# Patient Record
Sex: FEMALE | Race: BLACK OR AFRICAN AMERICAN | NOT HISPANIC OR LATINO
[De-identification: names, ages, dates, MRNs, and addresses within clinical notes are randomized per-mention and may not be internally consistent; named-entity substitution may affect disease eponyms.]

---

## 2021-01-19 PROBLEM — Z00.00 ENCOUNTER FOR PREVENTIVE HEALTH EXAMINATION: Status: ACTIVE | Noted: 2021-01-19

## 2021-01-20 ENCOUNTER — APPOINTMENT (OUTPATIENT)
Dept: NEUROLOGY | Facility: CLINIC | Age: 71
End: 2021-01-20
Payer: MEDICARE

## 2021-01-20 VITALS
DIASTOLIC BLOOD PRESSURE: 77 MMHG | OXYGEN SATURATION: 98 % | WEIGHT: 117 LBS | TEMPERATURE: 97.8 F | SYSTOLIC BLOOD PRESSURE: 121 MMHG | HEIGHT: 62 IN | BODY MASS INDEX: 21.53 KG/M2 | HEART RATE: 84 BPM

## 2021-01-20 DIAGNOSIS — I63.9 CEREBRAL INFARCTION, UNSPECIFIED: ICD-10-CM

## 2021-01-20 PROCEDURE — 99204 OFFICE O/P NEW MOD 45 MIN: CPT

## 2021-01-20 PROCEDURE — 99072 ADDL SUPL MATRL&STAF TM PHE: CPT

## 2021-01-20 RX ORDER — ATORVASTATIN CALCIUM 20 MG/1
20 TABLET, FILM COATED ORAL
Qty: 90 | Refills: 0 | Status: ACTIVE | COMMUNITY
Start: 2021-01-20 | End: 1900-01-01

## 2021-01-20 NOTE — REVIEW OF SYSTEMS
[Poor Coordination] : poor coordination [Numbness] : numbness [Difficulty Walking] : difficulty walking [Ataxia] : ataxia [Limping] : limping [Eyesight Problems] : eyesight problems [Fever] : no fever [Feeling Poorly] : not feeling poorly [Feeling Tired] : not feeling tired [Confused or Disoriented] : no confusion [Memory Lapses or Loss] : no memory loss [Decr. Concentrating Ability] : no decrease in concentrating ability [Difficulty with Language] : no ~M difficulty with language [Facial Weakness] : no facial weakness [Arm Weakness] : no arm weakness [Hand Weakness] : no hand weakness [Leg Weakness] : no leg weakness [Difficulty Writing] : no difficulty writing [Difficulties in Speech] : no speech difficulties [Tingling] : no tingling [Hypersensitivity] : no hypersensitivity [Seizures] : no convulsions [Dizziness] : no dizziness [Fainting] : no fainting [Lightheadedness] : no lightheadedness [Vertigo] : no vertigo [Migraine Headache] : no migraine headache [Inability to Walk] : able to walk [Frequent Falls] : not falling [Suicidal] : not suicidal [Sleep Disturbances] : no sleep disturbances [Anxiety] : no anxiety [Depression] : no depression [Eye Pain] : no eye pain [Red Eyes] : eyes not red [Discharge From Eyes] : no purulent discharge from the eyes [Dry Eyes] : no dryness of the eyes [Earache] : no earache [Loss Of Hearing] : no hearing loss [Nosebleeds] : no nosebleeds [Nasal Discharge] : no nasal discharge [Heart Rate Is Slow] : the heart rate was not slow [Heart Rate Is Fast] : the heart rate was not fast [Chest Pain] : no chest pain [Palpitations] : no palpitations [Leg Claudication] : no intermittent leg claudication [Lower Ext Edema] : no lower extremity edema [Shortness Of Breath] : no shortness of breath [Wheezing] : no wheezing [Cough] : no cough [SOB on Exertion] : no shortness of breath during exertion [Orthopnea] : no orthopnea [PND] : no PND [Abdominal Pain] : no abdominal pain [Vomiting] : no vomiting [Constipation] : no constipation [Diarrhea] : no diarrhea [Heartburn] : no heartburn [Melena] : no melena [Dysuria] : no dysuria [Incontinence] : no incontinence [Pelvic Pain] : no pelvic pain [Arthralgias] : no arthralgias [Joint Pain] : no joint pain [Joint Swelling] : no joint swelling [Skin Lesions] : no skin lesions [Skin Wound] : no skin wound [Muscle Weakness] : no muscle weakness [Easy Bleeding] : no tendency for easy bleeding [Easy Bruising] : no tendency for easy bruising [Swollen Glands] : no swollen glands [Swollen Glands In The Neck] : no swollen glands in the neck

## 2021-01-20 NOTE — HISTORY OF PRESENT ILLNESS
[FreeTextEntry1] : In 2010 she suffered  difficulty walking and a tendency to fall to her left. She was taken to the hospital and evaluated and sent home with a diagnosis of stroke. She had to retire at that point. Three months ago in the midst of the Coronavirus pandemic she suffered an acute attack of vertigo (spinning) that she suffered on her own for 3 days after which she was better. She did not go to the hospital for fear of the pandemic. At some time in the past she was evaluated by a cardiologist who monitored her rhythm with a recorder for 3 months and reportedly found not abnormality

## 2021-01-20 NOTE — PHYSICAL EXAM
[General Appearance - Alert] : alert [General Appearance - In No Acute Distress] : in no acute distress [Oriented To Time, Place, And Person] : oriented to person, place, and time [Impaired Insight] : insight and judgment were intact [Affect] : the affect was normal [Person] : oriented to person [Place] : oriented to place [Time] : oriented to time [Short Term Intact] : short term memory intact [Remote Intact] : remote memory intact [Registration Intact] : recent registration memory intact [Span Intact] : the attention span was normal [Concentration Intact] : normal concentrating ability [Visual Intact] : visual attention was ~T not ~L decreased [Naming Objects] : no difficulty naming common objects [Repeating Phrases] : no difficulty repeating a phrase [Fluency] : fluency intact [Comprehension] : comprehension intact [Cranial Nerves Optic (II)] : visual acuity intact bilaterally,  visual fields full to confrontation, pupils equal round and reactive to light [Cranial Nerves Oculomotor (III)] : extraocular motion intact [Cranial Nerves Trigeminal (V)] : facial sensation intact symmetrically [Cranial Nerves Facial (VII)] : face symmetrical [Cranial Nerves Vestibulocochlear (VIII)] : hearing was intact bilaterally [Cranial Nerves Glossopharyngeal (IX)] : tongue and palate midline [Cranial Nerves Accessory (XI - Cranial And Spinal)] : head turning and shoulder shrug symmetric [CNS Hypoglossal Tongue Protrusion Deviated To Left] : tongue deviates to the left with protrusion [Motor Handedness Right-Handed] : the patient is right hand dominant [Paresis Pronator Drift Left-Sided] : a left-sided pronator drift was present [Motor Strength Upper Extremities Left] : there was weakness of the left upper extremity [Motor Strength Lower Extremities Left] : there was weakness of the left lower extremity [Sensation Tactile Decrease] : light touch was intact [Sensation Pain / Temperature Decrease] : pain and temperature was intact [Sensation Vibration Decrease] : vibration was intact [Proprioception] : proprioception was intact [Limited Balance] : the patient's balance was impaired [Dysdiadochokinesia On The Left] : present on the left side [Coordination - Dysmetria Impaired Finger-to-Nose Left Only] : present on the left side [Coordination Dysmetria Impaired Heel-to-Shin Using Left Heel] : present on the left side [2+] : Brachioradialis right 2+ [3+] : Brachioradialis left 3+ [1+] : Patella left 1+ [0] : Ankle jerk left 0 [Plantar Reflex Left Only] : abnormal on the left [Sclera] : the sclera and conjunctiva were normal [PERRL With Normal Accommodation] : pupils were equal in size, round, reactive to light, with normal accommodation [Extraocular Movements] : extraocular movements were intact [Optic Disc Abnormality] : the optic disc were normal in size and color [No APD] : no afferent pupillary defect [No MALIKA] : no internuclear ophthalmoplegia [Full Visual Field] : full visual field [Outer Ear] : the ears and nose were normal in appearance [Oropharynx] : the oropharynx was normal [Paresis Pronator Drift Right-Sided] : no pronator drift on the right [Coordination - Dysmetria Impaired Finger-to-Nose Right Only] : not present on the ride side [Dysdiadochokinesia On The Right] : not present on the ride side [Plantar Reflex Right Only] : normal on the right [___] : absent on the right [___] : absent on the left [Primitive Reflexes] : primitive reflexes were absent [FreeTextEntry1] : Acuity 20/30 OS 20/30-3 OD with correction

## 2021-01-20 NOTE — DISCUSSION/SUMMARY
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

## 2021-01-21 ENCOUNTER — INPATIENT (INPATIENT)
Facility: HOSPITAL | Age: 71
LOS: 3 days | Discharge: HOME | DRG: 638 | End: 2021-01-25
Attending: STUDENT IN AN ORGANIZED HEALTH CARE EDUCATION/TRAINING PROGRAM | Admitting: STUDENT IN AN ORGANIZED HEALTH CARE EDUCATION/TRAINING PROGRAM
Payer: COMMERCIAL

## 2021-01-21 VITALS
RESPIRATION RATE: 16 BRPM | TEMPERATURE: 98 F | SYSTOLIC BLOOD PRESSURE: 153 MMHG | OXYGEN SATURATION: 99 % | HEIGHT: 65 IN | WEIGHT: 119.05 LBS | HEART RATE: 70 BPM | DIASTOLIC BLOOD PRESSURE: 91 MMHG

## 2021-01-21 DIAGNOSIS — R73.9 HYPERGLYCEMIA, UNSPECIFIED: ICD-10-CM

## 2021-01-21 LAB
ACETONE SERPL-MCNC: NEGATIVE — SIGNIFICANT CHANGE UP
ALBUMIN SERPL ELPH-MCNC: 3.3 G/DL — LOW (ref 3.5–5)
ALBUMIN SERPL ELPH-MCNC: 4.1 G/DL
ALP BLD-CCNC: 162 U/L
ALP SERPL-CCNC: 211 U/L — HIGH (ref 40–120)
ALT FLD-CCNC: 30 U/L DA — SIGNIFICANT CHANGE UP (ref 10–60)
ALT SERPL-CCNC: 22 U/L
ANION GAP SERPL CALC-SCNC: 15 MMOL/L
ANION GAP SERPL CALC-SCNC: 9 MMOL/L — SIGNIFICANT CHANGE UP (ref 5–17)
APPEARANCE UR: CLEAR — SIGNIFICANT CHANGE UP
APPEARANCE: CLEAR
AST SERPL-CCNC: 17 U/L — SIGNIFICANT CHANGE UP (ref 10–40)
AST SERPL-CCNC: 20 U/L
BACTERIA # UR AUTO: ABNORMAL /HPF
BACTERIA: NEGATIVE
BASE EXCESS BLDV CALC-SCNC: 1.9 MMOL/L — SIGNIFICANT CHANGE UP (ref -2–2)
BASOPHILS # BLD AUTO: 0.02 K/UL — SIGNIFICANT CHANGE UP (ref 0–0.2)
BASOPHILS # BLD AUTO: 0.04 K/UL
BASOPHILS NFR BLD AUTO: 0.6 % — SIGNIFICANT CHANGE UP (ref 0–2)
BASOPHILS NFR BLD AUTO: 1.1 %
BILIRUB SERPL-MCNC: 0.7 MG/DL
BILIRUB SERPL-MCNC: 0.7 MG/DL — SIGNIFICANT CHANGE UP (ref 0.2–1.2)
BILIRUB UR-MCNC: NEGATIVE — SIGNIFICANT CHANGE UP
BILIRUBIN URINE: NEGATIVE
BLOOD GAS COMMENTS, VENOUS: SIGNIFICANT CHANGE UP
BLOOD URINE: NEGATIVE
BUN SERPL-MCNC: 10 MG/DL
BUN SERPL-MCNC: 11 MG/DL — SIGNIFICANT CHANGE UP (ref 7–18)
CALCIUM SERPL-MCNC: 9.6 MG/DL — SIGNIFICANT CHANGE UP (ref 8.4–10.5)
CALCIUM SERPL-MCNC: 9.8 MG/DL
CHLORIDE SERPL-SCNC: 94 MMOL/L
CHLORIDE SERPL-SCNC: 98 MMOL/L — SIGNIFICANT CHANGE UP (ref 96–108)
CHOLEST SERPL-MCNC: 213 MG/DL
CO2 SERPL-SCNC: 25 MMOL/L
CO2 SERPL-SCNC: 27 MMOL/L — SIGNIFICANT CHANGE UP (ref 22–31)
COLOR SPEC: YELLOW — SIGNIFICANT CHANGE UP
COLOR: NORMAL
CREAT SERPL-MCNC: 0.89 MG/DL
CREAT SERPL-MCNC: 0.93 MG/DL — SIGNIFICANT CHANGE UP (ref 0.5–1.3)
DIFF PNL FLD: NEGATIVE — SIGNIFICANT CHANGE UP
EOSINOPHIL # BLD AUTO: 0.09 K/UL
EOSINOPHIL # BLD AUTO: 0.11 K/UL — SIGNIFICANT CHANGE UP (ref 0–0.5)
EOSINOPHIL NFR BLD AUTO: 2.5 %
EOSINOPHIL NFR BLD AUTO: 3.2 % — SIGNIFICANT CHANGE UP (ref 0–6)
EPI CELLS # UR: SIGNIFICANT CHANGE UP /HPF
ESTIMATED AVERAGE GLUCOSE: >398 MG/DL
GLUCOSE QUALITATIVE U: ABNORMAL
GLUCOSE SERPL-MCNC: 558 MG/DL — CRITICAL HIGH (ref 70–99)
GLUCOSE SERPL-MCNC: 561 MG/DL
GLUCOSE UR QL: 1000 MG/DL
HBA1C MFR BLD HPLC: >15.5 %
HCO3 BLDV-SCNC: 27 MMOL/L — SIGNIFICANT CHANGE UP (ref 21–29)
HCT VFR BLD CALC: 41.4 % — SIGNIFICANT CHANGE UP (ref 34.5–45)
HCT VFR BLD CALC: 41.7 %
HDLC SERPL-MCNC: 100 MG/DL
HGB BLD-MCNC: 13.2 G/DL
HGB BLD-MCNC: 13.2 G/DL — SIGNIFICANT CHANGE UP (ref 11.5–15.5)
HOROWITZ INDEX BLDV+IHG-RTO: 21 — SIGNIFICANT CHANGE UP
HYALINE CASTS: 0 /LPF
IMM GRANULOCYTES NFR BLD AUTO: 0 % — SIGNIFICANT CHANGE UP (ref 0–1.5)
IMM GRANULOCYTES NFR BLD AUTO: 0.3 %
KETONES UR-MCNC: NEGATIVE — SIGNIFICANT CHANGE UP
KETONES URINE: ABNORMAL
LDLC SERPL CALC-MCNC: 95 MG/DL
LEUKOCYTE ESTERASE UR-ACNC: NEGATIVE — SIGNIFICANT CHANGE UP
LEUKOCYTE ESTERASE URINE: NEGATIVE
LYMPHOCYTES # BLD AUTO: 1.1 K/UL
LYMPHOCYTES # BLD AUTO: 1.22 K/UL — SIGNIFICANT CHANGE UP (ref 1–3.3)
LYMPHOCYTES # BLD AUTO: 35.1 % — SIGNIFICANT CHANGE UP (ref 13–44)
LYMPHOCYTES NFR BLD AUTO: 30 %
MAGNESIUM SERPL-MCNC: 1.9 MG/DL — SIGNIFICANT CHANGE UP (ref 1.6–2.6)
MAN DIFF?: NORMAL
MCHC RBC-ENTMCNC: 28.2 PG — SIGNIFICANT CHANGE UP (ref 27–34)
MCHC RBC-ENTMCNC: 28.8 PG
MCHC RBC-ENTMCNC: 31.7 GM/DL
MCHC RBC-ENTMCNC: 31.9 GM/DL — LOW (ref 32–36)
MCV RBC AUTO: 88.5 FL — SIGNIFICANT CHANGE UP (ref 80–100)
MCV RBC AUTO: 91 FL
MICROSCOPIC-UA: NORMAL
MONOCYTES # BLD AUTO: 0.29 K/UL
MONOCYTES # BLD AUTO: 0.3 K/UL — SIGNIFICANT CHANGE UP (ref 0–0.9)
MONOCYTES NFR BLD AUTO: 7.9 %
MONOCYTES NFR BLD AUTO: 8.6 % — SIGNIFICANT CHANGE UP (ref 2–14)
NEUTROPHILS # BLD AUTO: 1.83 K/UL — SIGNIFICANT CHANGE UP (ref 1.8–7.4)
NEUTROPHILS # BLD AUTO: 2.14 K/UL
NEUTROPHILS NFR BLD AUTO: 52.5 % — SIGNIFICANT CHANGE UP (ref 43–77)
NEUTROPHILS NFR BLD AUTO: 58.2 %
NITRITE UR-MCNC: NEGATIVE — SIGNIFICANT CHANGE UP
NITRITE URINE: NEGATIVE
NONHDLC SERPL-MCNC: 113 MG/DL
NRBC # BLD: 0 /100 WBCS — SIGNIFICANT CHANGE UP (ref 0–0)
PCO2 BLDV: 47 MMHG — SIGNIFICANT CHANGE UP (ref 35–50)
PH BLDV: 7.38 — SIGNIFICANT CHANGE UP (ref 7.35–7.45)
PH UR: 6.5 — SIGNIFICANT CHANGE UP (ref 5–8)
PH URINE: 6
PLATELET # BLD AUTO: 247 K/UL — SIGNIFICANT CHANGE UP (ref 150–400)
PLATELET # BLD AUTO: 266 K/UL
PO2 BLDV: 32 MMHG — SIGNIFICANT CHANGE UP (ref 25–45)
POTASSIUM SERPL-MCNC: 4 MMOL/L — SIGNIFICANT CHANGE UP (ref 3.5–5.3)
POTASSIUM SERPL-SCNC: 4 MMOL/L — SIGNIFICANT CHANGE UP (ref 3.5–5.3)
POTASSIUM SERPL-SCNC: 5 MMOL/L
PROT SERPL-MCNC: 6.7 G/DL
PROT SERPL-MCNC: 7.4 G/DL — SIGNIFICANT CHANGE UP (ref 6–8.3)
PROT UR-MCNC: NEGATIVE — SIGNIFICANT CHANGE UP
PROTEIN URINE: NEGATIVE
RBC # BLD: 4.58 M/UL
RBC # BLD: 4.68 M/UL — SIGNIFICANT CHANGE UP (ref 3.8–5.2)
RBC # FLD: 12.8 % — SIGNIFICANT CHANGE UP (ref 10.3–14.5)
RBC # FLD: 13 %
RBC CASTS # UR COMP ASSIST: SIGNIFICANT CHANGE UP /HPF (ref 0–2)
RED BLOOD CELLS URINE: 0 /HPF
SAO2 % BLDV: 55 % — LOW (ref 67–88)
SODIUM SERPL-SCNC: 134 MMOL/L
SODIUM SERPL-SCNC: 134 MMOL/L — LOW (ref 135–145)
SP GR SPEC: 1 — LOW (ref 1.01–1.02)
SPECIFIC GRAVITY URINE: 1.04
SQUAMOUS EPITHELIAL CELLS: 0 /HPF
TRIGL SERPL-MCNC: 90 MG/DL
UROBILINOGEN FLD QL: NEGATIVE — SIGNIFICANT CHANGE UP
UROBILINOGEN URINE: NORMAL
WBC # BLD: 3.48 K/UL — LOW (ref 3.8–10.5)
WBC # FLD AUTO: 3.48 K/UL — LOW (ref 3.8–10.5)
WBC # FLD AUTO: 3.67 K/UL
WBC UR QL: SIGNIFICANT CHANGE UP /HPF (ref 0–5)
WHITE BLOOD CELLS URINE: 0 /HPF

## 2021-01-21 PROCEDURE — 99222 1ST HOSP IP/OBS MODERATE 55: CPT

## 2021-01-21 PROCEDURE — 99285 EMERGENCY DEPT VISIT HI MDM: CPT

## 2021-01-21 RX ORDER — SODIUM CHLORIDE 9 MG/ML
1000 INJECTION INTRAMUSCULAR; INTRAVENOUS; SUBCUTANEOUS ONCE
Refills: 0 | Status: COMPLETED | OUTPATIENT
Start: 2021-01-21 | End: 2021-01-21

## 2021-01-21 RX ORDER — INSULIN HUMAN 100 [IU]/ML
5 INJECTION, SOLUTION SUBCUTANEOUS ONCE
Refills: 0 | Status: COMPLETED | OUTPATIENT
Start: 2021-01-21 | End: 2021-01-21

## 2021-01-21 RX ADMIN — SODIUM CHLORIDE 1000 MILLILITER(S): 9 INJECTION INTRAMUSCULAR; INTRAVENOUS; SUBCUTANEOUS at 21:39

## 2021-01-21 RX ADMIN — SODIUM CHLORIDE 1000 MILLILITER(S): 9 INJECTION INTRAMUSCULAR; INTRAVENOUS; SUBCUTANEOUS at 22:30

## 2021-01-21 NOTE — ED PROVIDER NOTE - OBJECTIVE STATEMENT
70F, pmh of htn, CVA (residual left sided weakness), presenting with hyperglycemia. patient reports she has had increased thirst and urination. was sent to emergency department by her doctor for elevated blood sugar. no fever, chest pain, shortness of breath, nausea, vomiting, pain or burning with urination.

## 2021-01-21 NOTE — H&P ADULT - NSHPPHYSICALEXAM_GEN_ALL_CORE
Vital Signs (24 Hrs):  T(C): 36.2 (01-21-21 @ 23:58), Max: 36.4 (01-21-21 @ 19:25)  HR: 60 (01-21-21 @ 23:58) (60 - 70)  BP: 130/73 (01-21-21 @ 23:58) (130/73 - 153/91)  RR: 17 (01-21-21 @ 23:58) (16 - 17)  SpO2: 98% (01-21-21 @ 23:58) (98% - 99%)

## 2021-01-21 NOTE — H&P ADULT - PROBLEM SELECTOR PLAN 1
Presented with hyperglycemia  Was given 5 units of regular insulin  Will give 10 units of lantus now, weight based  f/u HBA1c  Endo consult Dr Hannah

## 2021-01-21 NOTE — H&P ADULT - HISTORY OF PRESENT ILLNESS
71 yo female with medical history of htn, CVA in 2010 (residual left sided weakness), presenting with hyperglycemia. She states that she visited Neurologist(Dr Dykes) yesterday for routine check, was found to have hyperglycemia on routine labs. She was called today by him and was asked to go to ED. She reports having increased thirst and urination for past few weeks. She denies tingling or numbness of toes and fingers. Denies weight loss, appetite change or recent illness. She denies fever, chest pain, shortness of breath, nausea, vomiting, pain or burning with urination. No h/o sick contacts, trauma or fall.

## 2021-01-21 NOTE — ED ADULT TRIAGE NOTE - CHIEF COMPLAINT QUOTE
pt states she was sent by Dr. Chaney, her PMD, for hyperglycemia, pt states she has been unsteady and having generalized numbness for many days

## 2021-01-21 NOTE — H&P ADULT - ATTENDING COMMENTS
Pt seen and examined.  Case discussed with admitting resident.  70 year old woman with PMH of right hemispheric CVA with left sided weakness, HTN brought in on account of polyuria and polydipsia and noted with significantly elevated blood glucose. NO known hx of DM2.    Vital Signs Last 24 Hrs  T(C): 36.4 (21 Jan 2021 19:25), Max: 36.4 (21 Jan 2021 19:25)  T(F): 97.6 (21 Jan 2021 19:25), Max: 97.6 (21 Jan 2021 19:25)  HR: 70 (21 Jan 2021 19:25) (70 - 70)  BP: 153/91 (21 Jan 2021 19:25) (153/91 - 153/91)  RR: 16 (21 Jan 2021 19:25) (16 - 16)  SpO2: 99% (21 Jan 2021 19:25) (99% - 99%)      Labs                        13.2   3.48  )-----------( 247      ( 21 Jan 2021 21:26 )             41.4    01-21    134<L>  |  98  |  11  ----------------------------<  558<HH>  4.0   |  27  |  0.93    Ca    9.6      21 Jan 2021 21:26; Mg     1.9     01-21  TPro  7.4  /  Alb  3.3<L>  /  TBili  0.7  /  DBili  x   /  AST  17  /  ALT  30  /  AlkPhos  211<H>  01-21    Urinalysis Basic - ( 21 Jan 2021 20:52 ) glycosuria - 1000    - Impression  Hyperglycemia   - likely due to last -onset DM 2  Admit to Medicine  IVF hydration to correct glucose- induced diuresis  Exogenous subcut insulin to control elevated glucose  A1c, lipid panel, TSH  Endocrine consult Pt seen and examined.  Case discussed with admitting resident.  70 year old woman with PMH of right hemispheric CVA with left sided weakness, HTN brought in on account of polyuria and polydipsia and noted with significantly elevated blood glucose. NO known hx of DM2.    Vital Signs Last 24 Hrs  T(C): 36.4 (21 Jan 2021 19:25), Max: 36.4 (21 Jan 2021 19:25)  T(F): 97.6 (21 Jan 2021 19:25), Max: 97.6 (21 Jan 2021 19:25)  HR: 70 (21 Jan 2021 19:25) (70 - 70)  BP: 153/91 (21 Jan 2021 19:25) (153/91 - 153/91)  RR: 16 (21 Jan 2021 19:25) (16 - 16)  SpO2: 99% (21 Jan 2021 19:25) (99% - 99%)    Sensory deficits in the glove and stocking distribution  Otherwise other exams as above    Labs                        13.2   3.48  )-----------( 247      ( 21 Jan 2021 21:26 )             41.4    01-21    134<L>  |  98  |  11  ----------------------------<  558<HH>  4.0   |  27  |  0.93    Ca    9.6      21 Jan 2021 21:26; Mg     1.9     01-21  TPro  7.4  /  Alb  3.3<L>  /  TBili  0.7  /  DBili  x   /  AST  17  /  ALT  30  /  AlkPhos  211<H>  01-21    Urinalysis Basic - ( 21 Jan 2021 20:52 ) glycosuria - 1000    - Impression  Hyperglycemia   - likely due to new -onset DM 2 with suspected motor/sensory neuropathy  Admit to Medicine  IVF hydration to correct glucose- induced diuresis  Exogenous subcut insulin to control elevated glucose  A1c, lipid panel, TSH  Endocrine consult

## 2021-01-21 NOTE — ED PROVIDER NOTE - CLINICAL SUMMARY MEDICAL DECISION MAKING FREE TEXT BOX
70F presenting with hyperglycemia. no fever, nausea, vomiting, chest pain or shortness of breath. dka vs diabetes. plan for labs, fluids, will reassess.

## 2021-01-21 NOTE — H&P ADULT - ASSESSMENT
71 yo female with medical history of htn, CVA in 2010 (residual left sided weakness), presenting with hyperglycemia.    ED:  [id number]    Patient is being admitted to medicine for new onset diabetes.

## 2021-01-21 NOTE — H&P ADULT - MS EXT PE MLT D E PC
She has multiple constitutional symptoms. I suspect she is primarily anxious and worried about her health and possibly depressed. In order to rule out in endocrine for rheumatologic systemic disorder we will order some blood work including hormone levels vitamin levels and bubbles of inflammatory markers. If these are all normal I think she might be candidate for low-dose antidepressant.   no clubbing/no cyanosis/no pedal edema

## 2021-01-22 DIAGNOSIS — E11.9 TYPE 2 DIABETES MELLITUS WITHOUT COMPLICATIONS: ICD-10-CM

## 2021-01-22 DIAGNOSIS — I63.9 CEREBRAL INFARCTION, UNSPECIFIED: ICD-10-CM

## 2021-01-22 DIAGNOSIS — Z29.9 ENCOUNTER FOR PROPHYLACTIC MEASURES, UNSPECIFIED: ICD-10-CM

## 2021-01-22 DIAGNOSIS — I10 ESSENTIAL (PRIMARY) HYPERTENSION: ICD-10-CM

## 2021-01-22 LAB
ALBUMIN SERPL ELPH-MCNC: 2.6 G/DL — LOW (ref 3.5–5)
ALP SERPL-CCNC: 138 U/L — HIGH (ref 40–120)
ALT FLD-CCNC: 25 U/L DA — SIGNIFICANT CHANGE UP (ref 10–60)
ANION GAP SERPL CALC-SCNC: 7 MMOL/L — SIGNIFICANT CHANGE UP (ref 5–17)
AST SERPL-CCNC: 16 U/L — SIGNIFICANT CHANGE UP (ref 10–40)
B PERT DNA SPEC QL NAA+PROBE: SIGNIFICANT CHANGE UP
BASOPHILS # BLD AUTO: 0.03 K/UL — SIGNIFICANT CHANGE UP (ref 0–0.2)
BASOPHILS NFR BLD AUTO: 0.9 % — SIGNIFICANT CHANGE UP (ref 0–2)
BILIRUB SERPL-MCNC: 0.7 MG/DL — SIGNIFICANT CHANGE UP (ref 0.2–1.2)
BUN SERPL-MCNC: 7 MG/DL — SIGNIFICANT CHANGE UP (ref 7–18)
C PNEUM DNA SPEC QL NAA+PROBE: SIGNIFICANT CHANGE UP
CALCIUM SERPL-MCNC: 8.9 MG/DL — SIGNIFICANT CHANGE UP (ref 8.4–10.5)
CHLORIDE SERPL-SCNC: 105 MMOL/L — SIGNIFICANT CHANGE UP (ref 96–108)
CHOLEST SERPL-MCNC: 168 MG/DL — SIGNIFICANT CHANGE UP
CO2 SERPL-SCNC: 25 MMOL/L — SIGNIFICANT CHANGE UP (ref 22–31)
CREAT SERPL-MCNC: 0.67 MG/DL — SIGNIFICANT CHANGE UP (ref 0.5–1.3)
EOSINOPHIL # BLD AUTO: 0.15 K/UL — SIGNIFICANT CHANGE UP (ref 0–0.5)
EOSINOPHIL NFR BLD AUTO: 4.3 % — SIGNIFICANT CHANGE UP (ref 0–6)
FLUAV H1 2009 PAND RNA SPEC QL NAA+PROBE: SIGNIFICANT CHANGE UP
FLUAV H1 RNA SPEC QL NAA+PROBE: SIGNIFICANT CHANGE UP
FLUAV H3 RNA SPEC QL NAA+PROBE: SIGNIFICANT CHANGE UP
FLUAV SUBTYP SPEC NAA+PROBE: SIGNIFICANT CHANGE UP
FLUBV RNA SPEC QL NAA+PROBE: SIGNIFICANT CHANGE UP
FOLATE SERPL-MCNC: 17.2 NG/ML — SIGNIFICANT CHANGE UP
GLUCOSE BLDC GLUCOMTR-MCNC: 190 MG/DL — HIGH (ref 70–99)
GLUCOSE BLDC GLUCOMTR-MCNC: 286 MG/DL — HIGH (ref 70–99)
GLUCOSE BLDC GLUCOMTR-MCNC: 287 MG/DL — HIGH (ref 70–99)
GLUCOSE BLDC GLUCOMTR-MCNC: 355 MG/DL — HIGH (ref 70–99)
GLUCOSE SERPL-MCNC: 288 MG/DL — HIGH (ref 70–99)
HADV DNA SPEC QL NAA+PROBE: SIGNIFICANT CHANGE UP
HCOV PNL SPEC NAA+PROBE: SIGNIFICANT CHANGE UP
HCT VFR BLD CALC: 35.5 % — SIGNIFICANT CHANGE UP (ref 34.5–45)
HCV AB S/CO SERPL IA: 0.07 S/CO — SIGNIFICANT CHANGE UP (ref 0–0.99)
HCV AB SERPL-IMP: SIGNIFICANT CHANGE UP
HDLC SERPL-MCNC: 91 MG/DL — SIGNIFICANT CHANGE UP
HGB BLD-MCNC: 11.2 G/DL — LOW (ref 11.5–15.5)
HMPV RNA SPEC QL NAA+PROBE: SIGNIFICANT CHANGE UP
HPIV1 RNA SPEC QL NAA+PROBE: SIGNIFICANT CHANGE UP
HPIV2 RNA SPEC QL NAA+PROBE: SIGNIFICANT CHANGE UP
HPIV3 RNA SPEC QL NAA+PROBE: SIGNIFICANT CHANGE UP
HPIV4 RNA SPEC QL NAA+PROBE: SIGNIFICANT CHANGE UP
IMM GRANULOCYTES NFR BLD AUTO: 0.3 % — SIGNIFICANT CHANGE UP (ref 0–1.5)
INR BLD: 0.97 RATIO — SIGNIFICANT CHANGE UP (ref 0.88–1.16)
LIPID PNL WITH DIRECT LDL SERPL: 65 MG/DL — SIGNIFICANT CHANGE UP
LYMPHOCYTES # BLD AUTO: 1.39 K/UL — SIGNIFICANT CHANGE UP (ref 1–3.3)
LYMPHOCYTES # BLD AUTO: 40.3 % — SIGNIFICANT CHANGE UP (ref 13–44)
MAGNESIUM SERPL-MCNC: 1.6 MG/DL — SIGNIFICANT CHANGE UP (ref 1.6–2.6)
MCHC RBC-ENTMCNC: 27.9 PG — SIGNIFICANT CHANGE UP (ref 27–34)
MCHC RBC-ENTMCNC: 31.5 GM/DL — LOW (ref 32–36)
MCV RBC AUTO: 88.5 FL — SIGNIFICANT CHANGE UP (ref 80–100)
MONOCYTES # BLD AUTO: 0.36 K/UL — SIGNIFICANT CHANGE UP (ref 0–0.9)
MONOCYTES NFR BLD AUTO: 10.4 % — SIGNIFICANT CHANGE UP (ref 2–14)
NEUTROPHILS # BLD AUTO: 1.51 K/UL — LOW (ref 1.8–7.4)
NEUTROPHILS NFR BLD AUTO: 43.8 % — SIGNIFICANT CHANGE UP (ref 43–77)
NON HDL CHOLESTEROL: 77 MG/DL — SIGNIFICANT CHANGE UP
NRBC # BLD: 0 /100 WBCS — SIGNIFICANT CHANGE UP (ref 0–0)
PHOSPHATE SERPL-MCNC: 2 MG/DL — LOW (ref 2.5–4.5)
PLATELET # BLD AUTO: 220 K/UL — SIGNIFICANT CHANGE UP (ref 150–400)
POTASSIUM SERPL-MCNC: 3.6 MMOL/L — SIGNIFICANT CHANGE UP (ref 3.5–5.3)
POTASSIUM SERPL-SCNC: 3.6 MMOL/L — SIGNIFICANT CHANGE UP (ref 3.5–5.3)
PROT SERPL-MCNC: 5.9 G/DL — LOW (ref 6–8.3)
PROTHROM AB SERPL-ACNC: 11.6 SEC — SIGNIFICANT CHANGE UP (ref 10.6–13.6)
RAPID RVP RESULT: SIGNIFICANT CHANGE UP
RBC # BLD: 4.01 M/UL — SIGNIFICANT CHANGE UP (ref 3.8–5.2)
RBC # FLD: 12.7 % — SIGNIFICANT CHANGE UP (ref 10.3–14.5)
RSV RNA SPEC QL NAA+PROBE: SIGNIFICANT CHANGE UP
RV+EV RNA SPEC QL NAA+PROBE: SIGNIFICANT CHANGE UP
SARS-COV-2 IGG SERPL QL IA: NEGATIVE — SIGNIFICANT CHANGE UP
SARS-COV-2 IGM SERPL IA-ACNC: <0.1 INDEX — SIGNIFICANT CHANGE UP
SARS-COV-2 RNA SPEC QL NAA+PROBE: SIGNIFICANT CHANGE UP
SODIUM SERPL-SCNC: 137 MMOL/L — SIGNIFICANT CHANGE UP (ref 135–145)
TRIGL SERPL-MCNC: 60 MG/DL — SIGNIFICANT CHANGE UP
TSH SERPL-MCNC: 2.44 UU/ML — SIGNIFICANT CHANGE UP (ref 0.34–4.82)
VIT B12 SERPL-MCNC: 1305 PG/ML — HIGH (ref 232–1245)
WBC # BLD: 3.45 K/UL — LOW (ref 3.8–10.5)
WBC # FLD AUTO: 3.45 K/UL — LOW (ref 3.8–10.5)

## 2021-01-22 PROCEDURE — 99232 SBSQ HOSP IP/OBS MODERATE 35: CPT | Mod: GC

## 2021-01-22 RX ORDER — LIDOCAINE 4 G/100G
1 CREAM TOPICAL ONCE
Refills: 0 | Status: DISCONTINUED | OUTPATIENT
Start: 2021-01-22 | End: 2021-01-22

## 2021-01-22 RX ORDER — AMLODIPINE BESYLATE 2.5 MG/1
10 TABLET ORAL DAILY
Refills: 0 | Status: DISCONTINUED | OUTPATIENT
Start: 2021-01-22 | End: 2021-01-25

## 2021-01-22 RX ORDER — INSULIN GLARGINE 100 [IU]/ML
15 INJECTION, SOLUTION SUBCUTANEOUS AT BEDTIME
Refills: 0 | Status: DISCONTINUED | OUTPATIENT
Start: 2021-01-22 | End: 2021-01-25

## 2021-01-22 RX ORDER — ATORVASTATIN CALCIUM 80 MG/1
20 TABLET, FILM COATED ORAL AT BEDTIME
Refills: 0 | Status: DISCONTINUED | OUTPATIENT
Start: 2021-01-22 | End: 2021-01-23

## 2021-01-22 RX ORDER — POTASSIUM PHOSPHATE, MONOBASIC POTASSIUM PHOSPHATE, DIBASIC 236; 224 MG/ML; MG/ML
30 INJECTION, SOLUTION INTRAVENOUS ONCE
Refills: 0 | Status: COMPLETED | OUTPATIENT
Start: 2021-01-22 | End: 2021-01-22

## 2021-01-22 RX ORDER — INSULIN GLARGINE 100 [IU]/ML
10 INJECTION, SOLUTION SUBCUTANEOUS AT BEDTIME
Refills: 0 | Status: DISCONTINUED | OUTPATIENT
Start: 2021-01-22 | End: 2021-01-22

## 2021-01-22 RX ORDER — INSULIN LISPRO 100/ML
VIAL (ML) SUBCUTANEOUS
Refills: 0 | Status: DISCONTINUED | OUTPATIENT
Start: 2021-01-22 | End: 2021-01-25

## 2021-01-22 RX ORDER — PANTOPRAZOLE SODIUM 20 MG/1
40 TABLET, DELAYED RELEASE ORAL
Refills: 0 | Status: DISCONTINUED | OUTPATIENT
Start: 2021-01-22 | End: 2021-01-25

## 2021-01-22 RX ORDER — ENOXAPARIN SODIUM 100 MG/ML
40 INJECTION SUBCUTANEOUS DAILY
Refills: 0 | Status: DISCONTINUED | OUTPATIENT
Start: 2021-01-22 | End: 2021-01-25

## 2021-01-22 RX ORDER — INSULIN LISPRO 100/ML
4 VIAL (ML) SUBCUTANEOUS
Refills: 0 | Status: DISCONTINUED | OUTPATIENT
Start: 2021-01-22 | End: 2021-01-25

## 2021-01-22 RX ADMIN — INSULIN HUMAN 5 UNIT(S): 100 INJECTION, SOLUTION SUBCUTANEOUS at 01:24

## 2021-01-22 RX ADMIN — AMLODIPINE BESYLATE 10 MILLIGRAM(S): 2.5 TABLET ORAL at 06:30

## 2021-01-22 RX ADMIN — INSULIN GLARGINE 15 UNIT(S): 100 INJECTION, SOLUTION SUBCUTANEOUS at 21:28

## 2021-01-22 RX ADMIN — PANTOPRAZOLE SODIUM 40 MILLIGRAM(S): 20 TABLET, DELAYED RELEASE ORAL at 06:30

## 2021-01-22 RX ADMIN — SODIUM CHLORIDE 1000 MILLILITER(S): 9 INJECTION INTRAMUSCULAR; INTRAVENOUS; SUBCUTANEOUS at 01:18

## 2021-01-22 RX ADMIN — Medication 3: at 16:45

## 2021-01-22 RX ADMIN — ATORVASTATIN CALCIUM 20 MILLIGRAM(S): 80 TABLET, FILM COATED ORAL at 21:29

## 2021-01-22 RX ADMIN — POTASSIUM PHOSPHATE, MONOBASIC POTASSIUM PHOSPHATE, DIBASIC 83.33 MILLIMOLE(S): 236; 224 INJECTION, SOLUTION INTRAVENOUS at 09:07

## 2021-01-22 RX ADMIN — INSULIN GLARGINE 10 UNIT(S): 100 INJECTION, SOLUTION SUBCUTANEOUS at 03:57

## 2021-01-22 RX ADMIN — Medication 3: at 12:09

## 2021-01-22 RX ADMIN — Medication 4 UNIT(S): at 16:45

## 2021-01-22 RX ADMIN — Medication 2: at 08:30

## 2021-01-22 RX ADMIN — ENOXAPARIN SODIUM 40 MILLIGRAM(S): 100 INJECTION SUBCUTANEOUS at 11:34

## 2021-01-22 RX ADMIN — Medication 4 UNIT(S): at 12:09

## 2021-01-22 NOTE — CONSULT NOTE ADULT - SUBJECTIVE AND OBJECTIVE BOX
Patient is a 70y old  Female who presents with a chief complaint of Hyperglycemia (2021 23:35)      HPI:  71 yo female with medical history of htn, CVA in 2010 (residual left sided weakness), presenting with hyperglycemia. She states that she visited Neurologist(Dr Dykes) yesterday for routine check, was found to have hyperglycemia on routine labs. She was called today by him and was asked to go to ED. She reports having increased thirst and urination for past few weeks. She denies tingling or numbness of toes and fingers. Denies weight loss, appetite change or recent illness. She denies fever, chest pain, shortness of breath, nausea, vomiting, pain or burning with urination. No h/o sick contacts, trauma or fall. (2021 23:35). Pt admits to symptomatic hyperglycemia for past few weeks.       PAST MEDICAL & SURGICAL HISTORY:  CVA (cerebral vascular accident)    HTN (hypertension)           MEDICATIONS  (STANDING):  amLODIPine   Tablet 10 milliGRAM(s) Oral daily  atorvastatin 20 milliGRAM(s) Oral at bedtime  enoxaparin Injectable 40 milliGRAM(s) SubCutaneous daily  insulin glargine Injectable (LANTUS) 15 Unit(s) SubCutaneous at bedtime  insulin lispro (ADMELOG) corrective regimen sliding scale   SubCutaneous three times a day before meals  insulin lispro Injectable (ADMELOG) 4 Unit(s) SubCutaneous three times a day before meals  pantoprazole    Tablet 40 milliGRAM(s) Oral before breakfast    MEDICATIONS  (PRN):      FAMILY HISTORY:      SOCIAL HISTORY:      REVIEW OF SYSTEMS:  CONSTITUTIONAL: No fever, weight loss, or fatigue  EYES: No eye pain, visual disturbances, or discharge  ENT:  No difficulty hearing, tinnitus, vertigo; No sinus or throat pain  NECK: No pain or stiffness  RESPIRATORY: No cough, wheezing, chills or hemoptysis; No Shortness of Breath  CARDIOVASCULAR: No chest pain, palpitations, passing out, dizziness, or leg swelling  GASTROINTESTINAL: No abdominal or epigastric pain. No nausea, vomiting, or hematemesis; No diarrhea or constipation. No melena or hematochezia.  GENITOURINARY: No dysuria, frequency, hematuria, or incontinence  NEUROLOGICAL: No headaches, memory loss, loss of strength, numbness, or tremors  SKIN: No itching, burning, rashes, or lesions   LYMPH Nodes: No enlarged glands  ENDOCRINE: No heat or cold intolerance; No hair loss  MUSCULOSKELETAL: No joint pain or swelling; No muscle, back, or extremity pain  PSYCHIATRIC: No depression, anxiety, mood swings, or difficulty sleeping  HEME/LYMPH: No easy bruising, or bleeding gums  ALLERGY AND IMMUNOLOGIC: No hives or eczema	        Vital Signs Last 24 Hrs  T(C): 36.7 (2021 06:17), Max: 36.7 (2021 06:17)  T(F): 98.1 (2021 06:17), Max: 98.1 (2021 06:17)  HR: 57 (2021 06:17) (56 - 70)  BP: 129/73 (2021 06:17) (129/73 - 153/91)  BP(mean): --  RR: 17 (2021 06:17) (15 - 17)  SpO2: 100% (2021 06:17) (98% - 100%)      Constitutional:      HEENT: nad    Neck:  No JVD, bruits or thyromegaly    Respiratory:  Clear without rales or rhonchi    Cardiovascular:  RR without murmur, rub or gallop.    Gastrointestinal: Soft without hepatosplenomegaly.    Extremities: without cyanosis, clubbing or edema.    Neurological:  Oriented   x 3     . No gross sensory or motor defects.        LABS:                        11.2   3.45  )-----------( 220      ( 2021 03:56 )             35.5         137  |  105  |  7   ----------------------------<  288<H>  3.6   |  25  |  0.67    Ca    8.9      2021 03:56  Phos  2.0       Mg     1.6         TPro  5.9<L>  /  Alb  2.6<L>  /  TBili  0.7  /  DBili  x   /  AST  16  /  ALT  25  /  AlkPhos  138<H>          PT/INR - ( 2021 07:32 )   PT: 11.6 sec;   INR: 0.97 ratio           Urinalysis Basic - ( 2021 20:52 )    Color: Yellow / Appearance: Clear / S.005 / pH: x  Gluc: x / Ketone: Negative  / Bili: Negative / Urobili: Negative   Blood: x / Protein: Negative / Nitrite: Negative   Leuk Esterase: Negative / RBC: 0-2 /HPF / WBC 0-2 /HPF   Sq Epi: x / Non Sq Epi: Few /HPF / Bacteria: Few /HPF      CAPILLARY BLOOD GLUCOSE      POCT Blood Glucose.: 355 mg/dL (2021 01:10)  POCT Blood Glucose.: 560 mg/dL (2021 20:47)      RADIOLOGY & ADDITIONAL STUDIES:

## 2021-01-22 NOTE — PROGRESS NOTE ADULT - PROBLEM SELECTOR PLAN 1
Presented with hyperglycemia  Was given 5 units of regular insulin  Will give 10 units of lantus now, weight based  F/u HBA1c   F/u GADA, C peptide, Islet cell antibody    Endo consult Dr Hannah ; recommended 15units of lantus and 4 units premeals   Patient will need for insulin tx upon discharge, She is reluctant but agreed Presented with hyperglycemia  Was given 5 units of regular insulin  Will give 10 units of lantus now, weight based  F/u HBA1c   F/u GADA, C peptide, Islet cell antibody    -AM glucagon level - r/o glucagonoma  Endo consult Dr Hannah ; recommended 15units of lantus and 4 units premeals   Patient will need for insulin tx upon discharge, She is reluctant but agreed

## 2021-01-22 NOTE — CONSULT NOTE ADULT - PROBLEM SELECTOR RECOMMENDATION 9
new onset symptomatic hyperglycemia  rec chnaging lantus to 15 units  add admelog 4 ac tid  d/w pt need for insulin tx  reluctant but agreed  lives with her daughter who is a nursing student  dm teaching  nutrition [employer] ac and hs  d/w hs

## 2021-01-22 NOTE — PROGRESS NOTE ADULT - SUBJECTIVE AND OBJECTIVE BOX
PGY-1 Progress Note discussed with attending    PAGER #: [-----] TILL 5:00 PM  PLEASE CONTACT ON CALL TEAM:  - On Call Team (Please refer to Eder) FROM 5:00 PM - 8:30PM  - Nightfloat Team FROM 8:30 -7:30 AM    INTERVAL HPI/OVERNIGHT EVENTS: No acute events overnight     MEDICATIONS:  amLODIPine   Tablet 10 milliGRAM(s) Oral daily  atorvastatin 20 milliGRAM(s) Oral at bedtime  enoxaparin Injectable 40 milliGRAM(s) SubCutaneous daily  insulin glargine Injectable (LANTUS) 15 Unit(s) SubCutaneous at bedtime  insulin lispro (ADMELOG) corrective regimen sliding scale   SubCutaneous three times a day before meals  insulin lispro Injectable (ADMELOG) 4 Unit(s) SubCutaneous three times a day before meals  pantoprazole    Tablet 40 milliGRAM(s) Oral before breakfast      REVIEW OF SYSTEMS:  CONSTITUTIONAL: No fever, weight loss, or fatigue  RESPIRATORY: No cough, wheezing, chills or hemoptysis; No shortness of breath  CARDIOVASCULAR: No chest pain, palpitations, dizziness, or leg swelling  GASTROINTESTINAL: No abdominal pain. No nausea, vomiting, or hematemesis; No diarrhea or constipation. No melena or hematochezia.  GENITOURINARY: No dysuria or hematuria, urinary frequency  NEUROLOGICAL: No headaches, memory loss, loss of strength, numbness, or tremors  SKIN: No itching, burning, rashes, or lesions     Vital Signs Last 24 Hrs  T(C): 36.7 (22 Jan 2021 06:17), Max: 36.7 (22 Jan 2021 06:17)  T(F): 98.1 (22 Jan 2021 06:17), Max: 98.1 (22 Jan 2021 06:17)  HR: 57 (22 Jan 2021 06:17) (56 - 70)  BP: 129/73 (22 Jan 2021 06:17) (129/73 - 153/91)  BP(mean): --  RR: 17 (22 Jan 2021 06:17) (15 - 17)  SpO2: 100% (22 Jan 2021 06:17) (98% - 100%)    PHYSICAL EXAMINATION:  GENERAL: NAD, well built  HEAD:  Atraumatic, Normocephalic  EYES:  conjunctiva and sclera clear  NECK: Supple, No JVD, Normal thyroid  CHEST/LUNG: Clear to auscultation. Clear to percussion bilaterally; No rales, rhonchi, wheezing, or rubs  HEART: Regular rate and rhythm; No murmurs, rubs, or gallops  ABDOMEN: Soft, Nontender, Nondistended; Bowel sounds present  NERVOUS SYSTEM:  Alert & Oriented X3,    EXTREMITIES:  2+ Peripheral Pulses, No clubbing, cyanosis, or edema  SKIN: warm dry                          11.2   3.45  )-----------( 220      ( 22 Jan 2021 03:56 )             35.5     01-22    137  |  105  |  7   ----------------------------<  288<H>  3.6   |  25  |  0.67    Ca    8.9      22 Jan 2021 03:56  Phos  2.0     01-22  Mg     1.6     01-22    TPro  5.9<L>  /  Alb  2.6<L>  /  TBili  0.7  /  DBili  x   /  AST  16  /  ALT  25  /  AlkPhos  138<H>  01-22    LIVER FUNCTIONS - ( 22 Jan 2021 03:56 )  Alb: 2.6 g/dL / Pro: 5.9 g/dL / ALK PHOS: 138 U/L / ALT: 25 U/L DA / AST: 16 U/L / GGT: x               PT/INR - ( 22 Jan 2021 07:32 )   PT: 11.6 sec;   INR: 0.97 ratio             CAPILLARY BLOOD GLUCOSE      RADIOLOGY & ADDITIONAL TESTS:

## 2021-01-22 NOTE — CONSULT NOTE ADULT - ASSESSMENT
71 yo female with medical history of htn, CVA in 2010 (residual left sided weakness), presenting with hyperglycemia.  Pt admits to symptomatic hyperglycemia for past few weeks.

## 2021-01-23 LAB
ALBUMIN SERPL ELPH-MCNC: 2.4 G/DL — LOW (ref 3.5–5)
ALP SERPL-CCNC: 86 U/L — SIGNIFICANT CHANGE UP (ref 40–120)
ALT FLD-CCNC: 24 U/L DA — SIGNIFICANT CHANGE UP (ref 10–60)
ANION GAP SERPL CALC-SCNC: 7 MMOL/L — SIGNIFICANT CHANGE UP (ref 5–17)
AST SERPL-CCNC: 17 U/L — SIGNIFICANT CHANGE UP (ref 10–40)
BASOPHILS # BLD AUTO: 0.03 K/UL — SIGNIFICANT CHANGE UP (ref 0–0.2)
BASOPHILS NFR BLD AUTO: 0.8 % — SIGNIFICANT CHANGE UP (ref 0–2)
BILIRUB SERPL-MCNC: 1 MG/DL — SIGNIFICANT CHANGE UP (ref 0.2–1.2)
BUN SERPL-MCNC: 10 MG/DL — SIGNIFICANT CHANGE UP (ref 7–18)
CALCIUM SERPL-MCNC: 8.8 MG/DL — SIGNIFICANT CHANGE UP (ref 8.4–10.5)
CHLORIDE SERPL-SCNC: 106 MMOL/L — SIGNIFICANT CHANGE UP (ref 96–108)
CO2 SERPL-SCNC: 28 MMOL/L — SIGNIFICANT CHANGE UP (ref 22–31)
CREAT SERPL-MCNC: 0.7 MG/DL — SIGNIFICANT CHANGE UP (ref 0.5–1.3)
EOSINOPHIL # BLD AUTO: 0.15 K/UL — SIGNIFICANT CHANGE UP (ref 0–0.5)
EOSINOPHIL NFR BLD AUTO: 4 % — SIGNIFICANT CHANGE UP (ref 0–6)
GLUCOSE BLDC GLUCOMTR-MCNC: 129 MG/DL — HIGH (ref 70–99)
GLUCOSE BLDC GLUCOMTR-MCNC: 130 MG/DL — HIGH (ref 70–99)
GLUCOSE BLDC GLUCOMTR-MCNC: 165 MG/DL — HIGH (ref 70–99)
GLUCOSE BLDC GLUCOMTR-MCNC: 239 MG/DL — HIGH (ref 70–99)
GLUCOSE BLDC GLUCOMTR-MCNC: 322 MG/DL — HIGH (ref 70–99)
GLUCOSE SERPL-MCNC: 111 MG/DL — HIGH (ref 70–99)
HCT VFR BLD CALC: 37.3 % — SIGNIFICANT CHANGE UP (ref 34.5–45)
HGB BLD-MCNC: 11.8 G/DL — SIGNIFICANT CHANGE UP (ref 11.5–15.5)
IMM GRANULOCYTES NFR BLD AUTO: 0.3 % — SIGNIFICANT CHANGE UP (ref 0–1.5)
LYMPHOCYTES # BLD AUTO: 1.48 K/UL — SIGNIFICANT CHANGE UP (ref 1–3.3)
LYMPHOCYTES # BLD AUTO: 39.8 % — SIGNIFICANT CHANGE UP (ref 13–44)
MAGNESIUM SERPL-MCNC: 1.8 MG/DL — SIGNIFICANT CHANGE UP (ref 1.6–2.6)
MCHC RBC-ENTMCNC: 28.4 PG — SIGNIFICANT CHANGE UP (ref 27–34)
MCHC RBC-ENTMCNC: 31.6 GM/DL — LOW (ref 32–36)
MCV RBC AUTO: 89.7 FL — SIGNIFICANT CHANGE UP (ref 80–100)
MONOCYTES # BLD AUTO: 0.36 K/UL — SIGNIFICANT CHANGE UP (ref 0–0.9)
MONOCYTES NFR BLD AUTO: 9.7 % — SIGNIFICANT CHANGE UP (ref 2–14)
NEUTROPHILS # BLD AUTO: 1.69 K/UL — LOW (ref 1.8–7.4)
NEUTROPHILS NFR BLD AUTO: 45.4 % — SIGNIFICANT CHANGE UP (ref 43–77)
NRBC # BLD: 0 /100 WBCS — SIGNIFICANT CHANGE UP (ref 0–0)
PHOSPHATE SERPL-MCNC: 3.4 MG/DL — SIGNIFICANT CHANGE UP (ref 2.5–4.5)
PLATELET # BLD AUTO: 234 K/UL — SIGNIFICANT CHANGE UP (ref 150–400)
POTASSIUM SERPL-MCNC: 3.4 MMOL/L — LOW (ref 3.5–5.3)
POTASSIUM SERPL-SCNC: 3.4 MMOL/L — LOW (ref 3.5–5.3)
PROT SERPL-MCNC: 5.8 G/DL — LOW (ref 6–8.3)
RBC # BLD: 4.16 M/UL — SIGNIFICANT CHANGE UP (ref 3.8–5.2)
RBC # FLD: 12.9 % — SIGNIFICANT CHANGE UP (ref 10.3–14.5)
SODIUM SERPL-SCNC: 141 MMOL/L — SIGNIFICANT CHANGE UP (ref 135–145)
WBC # BLD: 3.72 K/UL — LOW (ref 3.8–10.5)
WBC # FLD AUTO: 3.72 K/UL — LOW (ref 3.8–10.5)

## 2021-01-23 PROCEDURE — 74177 CT ABD & PELVIS W/CONTRAST: CPT | Mod: 26

## 2021-01-23 PROCEDURE — 99232 SBSQ HOSP IP/OBS MODERATE 35: CPT

## 2021-01-23 RX ORDER — ATORVASTATIN CALCIUM 80 MG/1
80 TABLET, FILM COATED ORAL AT BEDTIME
Refills: 0 | Status: DISCONTINUED | OUTPATIENT
Start: 2021-01-23 | End: 2021-01-25

## 2021-01-23 RX ORDER — LISINOPRIL 2.5 MG/1
5 TABLET ORAL DAILY
Refills: 0 | Status: DISCONTINUED | OUTPATIENT
Start: 2021-01-23 | End: 2021-01-25

## 2021-01-23 RX ORDER — POTASSIUM CHLORIDE 20 MEQ
20 PACKET (EA) ORAL
Refills: 0 | Status: COMPLETED | OUTPATIENT
Start: 2021-01-23 | End: 2021-01-23

## 2021-01-23 RX ADMIN — ATORVASTATIN CALCIUM 80 MILLIGRAM(S): 80 TABLET, FILM COATED ORAL at 21:42

## 2021-01-23 RX ADMIN — Medication 4 UNIT(S): at 12:23

## 2021-01-23 RX ADMIN — Medication 20 MILLIEQUIVALENT(S): at 17:10

## 2021-01-23 RX ADMIN — Medication 4 UNIT(S): at 08:23

## 2021-01-23 RX ADMIN — ENOXAPARIN SODIUM 40 MILLIGRAM(S): 100 INJECTION SUBCUTANEOUS at 12:23

## 2021-01-23 RX ADMIN — Medication 2: at 17:11

## 2021-01-23 RX ADMIN — Medication 4 UNIT(S): at 17:11

## 2021-01-23 RX ADMIN — INSULIN GLARGINE 15 UNIT(S): 100 INJECTION, SOLUTION SUBCUTANEOUS at 21:42

## 2021-01-23 RX ADMIN — AMLODIPINE BESYLATE 10 MILLIGRAM(S): 2.5 TABLET ORAL at 05:12

## 2021-01-23 RX ADMIN — Medication 20 MILLIEQUIVALENT(S): at 12:23

## 2021-01-23 RX ADMIN — PANTOPRAZOLE SODIUM 40 MILLIGRAM(S): 20 TABLET, DELAYED RELEASE ORAL at 05:13

## 2021-01-23 RX ADMIN — Medication 1: at 12:23

## 2021-01-23 NOTE — PROGRESS NOTE ADULT - SUBJECTIVE AND OBJECTIVE BOX
Kya Yanez MD  Division of Hospital Medicine  Pager: [phone] (NS)/[phone] ([facility])    24 HOUR EVENTS/ROS:    MEDICATIONS:  amLODIPine   Tablet 10 milliGRAM(s) Oral daily  enoxaparin Injectable 40 milliGRAM(s) SubCutaneous daily          pantoprazole    Tablet 40 milliGRAM(s) Oral before breakfast    atorvastatin 20 milliGRAM(s) Oral at bedtime  insulin glargine Injectable (LANTUS) 15 Unit(s) SubCutaneous at bedtime  insulin lispro (ADMELOG) corrective regimen sliding scale   SubCutaneous three times a day before meals  insulin lispro Injectable (ADMELOG) 4 Unit(s) SubCutaneous three times a day before meals    potassium chloride    Tablet ER 20 milliEquivalent(s) Oral every 2 hours      PHYSICAL EXAM:  T(C): 36.7 (01-23-21 @ 05:15), Max: 36.8 (01-22-21 @ 20:29)  HR: 74 (01-23-21 @ 05:15) (65 - 74)  BP: 125/75 (01-23-21 @ 05:15) (125/71 - 129/74)  RR: 16 (01-23-21 @ 05:15) (16 - 18)  SpO2: 99% (01-23-21 @ 05:15) (99% - 100%)  Wt(kg): --  Daily     Daily   I&O's Summary    TELEMETRY:     Appearance: NAD	  HEENT:   Normal oral mucosa, PERRL, EOMI	  Lymphatic: No lymphadenopathy  Cardiovascular: Normal S1 S2, No JVD, No murmurs, No edema  Respiratory: Lungs clear to auscultation	  Neuro/psych: Grossly non-focal, CN 2-12 intact, AAOX3, mood and affect normal  Gastrointestinal:  Soft, Non-tender, + BS	  Skin: No rashes, No ecchymoses, No cyanosis	  Extremities: Normal range of motion, No clubbing, cyanosis or edema  Vascular: Peripheral pulses palpable 2+ bilaterally    LABS:	 	                        11.8   3.72  )-----------( 234      ( 23 Jan 2021 07:51 )             37.3     01-23    141  |  106  |  10  ----------------------------<  111<H>  3.4<L>   |  28  |  0.70  01-22    137  |  105  |  7   ----------------------------<  288<H>  3.6   |  25  |  0.67    Ca    8.8      23 Jan 2021 07:51  Ca    8.9      22 Jan 2021 03:56  Phos  3.4     01-23  Phos  2.0     01-22  Mg     1.8     01-23  Mg     1.6     01-22    TPro  5.8<L>  /  Alb  2.4<L>  /  TBili  1.0  /  DBili  x   /  AST  17  /  ALT  24  /  AlkPhos  86  01-23  TPro  5.9<L>  /  Alb  2.6<L>  /  TBili  0.7  /  DBili  x   /  AST  16  /  ALT  25  /  AlkPhos  138<H>  01-22    proBNP:   Lipid Profile:   HgA1c:   TSH:   FS: CAPILLARY BLOOD GLUCOSE      POCT Blood Glucose.: 129 mg/dL (23 Jan 2021 08:12)  POCT Blood Glucose.: 190 mg/dL (22 Jan 2021 20:57)  POCT Blood Glucose.: 287 mg/dL (22 Jan 2021 15:53)  POCT Blood Glucose.: 286 mg/dL (22 Jan 2021 12:01)    BCX/UCX:   Coags: PT/INR - ( 22 Jan 2021 07:32 )   PT: 11.6 sec;   INR: 0.97 ratio             CARDIAC MARKERS:            	    ECG:  	  RADIOLOGY:    Consult notes reviewed:  Care discussed with providers:  	 Kya Yanez MD  Division of Hospital Medicine    24 HOUR EVENTS/ROS: No acute events overnight. Denies headaches, F/C, dizziness, syncope, CP/SOB, N/V, abdominal pain, dysuria, changes in BM, peripheral swelling, skin changes, new joint aches. Tolerating PO, ambulatory.     MEDICATIONS:  amLODIPine   Tablet 10 milliGRAM(s) Oral daily  enoxaparin Injectable 40 milliGRAM(s) SubCutaneous daily  pantoprazole    Tablet 40 milliGRAM(s) Oral before breakfast  atorvastatin 20 milliGRAM(s) Oral at bedtime  insulin glargine Injectable (LANTUS) 15 Unit(s) SubCutaneous at bedtime  insulin lispro (ADMELOG) corrective regimen sliding scale   SubCutaneous three times a day before meals  insulin lispro Injectable (ADMELOG) 4 Unit(s) SubCutaneous three times a day before meals  potassium chloride    Tablet ER 20 milliEquivalent(s) Oral every 2 hours    PHYSICAL EXAM:  T(C): 36.7 (01-23-21 @ 05:15), Max: 36.8 (01-22-21 @ 20:29)  HR: 74 (01-23-21 @ 05:15) (65 - 74)  BP: 125/75 (01-23-21 @ 05:15) (125/71 - 129/74)  RR: 16 (01-23-21 @ 05:15) (16 - 18)  SpO2: 99% (01-23-21 @ 05:15) (99% - 100%)  Wt(kg): --  Daily     Daily   I&O's Summary    Appearance: NAD	  HEENT:   Normal oral mucosa, PERRL, EOMI	  Lymphatic: No lymphadenopathy  Cardiovascular: Normal S1 S2, No JVD, No murmurs, No edema  Respiratory: Lungs clear to auscultation	  Neuro/psych: Grossly non-focal, CN 2-12 intact, AAOX3, mood and affect normal  Gastrointestinal:  Soft, Non-tender, + BS	  Skin: No rashes, No ecchymoses, No cyanosis	  Extremities: Normal range of motion, No clubbing, cyanosis or edema  Vascular: Peripheral pulses palpable 2+ bilaterally    LABS: reviewed personally - FSBG controlled, potassium mildly low, B12 OK/high	                        11.8   3.72  )-----------( 234      ( 23 Jan 2021 07:51 )             37.3     01-23    141  |  106  |  10  ----------------------------<  111<H>  3.4<L>   |  28  |  0.70  01-22    137  |  105  |  7   ----------------------------<  288<H>  3.6   |  25  |  0.67    Ca    8.8      23 Jan 2021 07:51  Ca    8.9      22 Jan 2021 03:56  Phos  3.4     01-23  Phos  2.0     01-22  Mg     1.8     01-23  Mg     1.6     01-22    TPro  5.8<L>  /  Alb  2.4<L>  /  TBili  1.0  /  DBili  x   /  AST  17  /  ALT  24  /  AlkPhos  86  01-23  TPro  5.9<L>  /  Alb  2.6<L>  /  TBili  0.7  /  DBili  x   /  AST  16  /  ALT  25  /  AlkPhos  138<H>  01-22    POCT Blood Glucose.: 129 mg/dL (23 Jan 2021 08:12)  POCT Blood Glucose.: 190 mg/dL (22 Jan 2021 20:57)  POCT Blood Glucose.: 287 mg/dL (22 Jan 2021 15:53)  POCT Blood Glucose.: 286 mg/dL (22 Jan 2021 12:01)    BCX/UCX:   Coags: PT/INR - ( 22 Jan 2021 07:32 )   PT: 11.6 sec;   INR: 0.97 ratio      Consult notes reviewed: endo

## 2021-01-24 ENCOUNTER — TRANSCRIPTION ENCOUNTER (OUTPATIENT)
Age: 71
End: 2021-01-24

## 2021-01-24 LAB
A1C WITH ESTIMATED AVERAGE GLUCOSE RESULT: >15.5 % — HIGH (ref 4–5.6)
ALBUMIN SERPL ELPH-MCNC: 2.3 G/DL — LOW (ref 3.5–5)
ALP SERPL-CCNC: 89 U/L — SIGNIFICANT CHANGE UP (ref 40–120)
ALT FLD-CCNC: 24 U/L DA — SIGNIFICANT CHANGE UP (ref 10–60)
ANION GAP SERPL CALC-SCNC: 7 MMOL/L — SIGNIFICANT CHANGE UP (ref 5–17)
AST SERPL-CCNC: 22 U/L — SIGNIFICANT CHANGE UP (ref 10–40)
B-OH-BUTYR SERPL-SCNC: 0.3 MMOL/L — SIGNIFICANT CHANGE UP
BASOPHILS # BLD AUTO: 0.03 K/UL — SIGNIFICANT CHANGE UP (ref 0–0.2)
BASOPHILS NFR BLD AUTO: 1 % — SIGNIFICANT CHANGE UP (ref 0–2)
BILIRUB SERPL-MCNC: 2.2 MG/DL — HIGH (ref 0.2–1.2)
BUN SERPL-MCNC: 12 MG/DL — SIGNIFICANT CHANGE UP (ref 7–18)
C PEPTIDE SERPL-MCNC: 1 NG/ML — LOW (ref 1.1–4.4)
CALCIUM SERPL-MCNC: 8.6 MG/DL — SIGNIFICANT CHANGE UP (ref 8.4–10.5)
CHLORIDE SERPL-SCNC: 104 MMOL/L — SIGNIFICANT CHANGE UP (ref 96–108)
CO2 SERPL-SCNC: 27 MMOL/L — SIGNIFICANT CHANGE UP (ref 22–31)
CREAT SERPL-MCNC: 0.69 MG/DL — SIGNIFICANT CHANGE UP (ref 0.5–1.3)
CULTURE RESULTS: NO GROWTH — SIGNIFICANT CHANGE UP
EOSINOPHIL # BLD AUTO: 0.12 K/UL — SIGNIFICANT CHANGE UP (ref 0–0.5)
EOSINOPHIL NFR BLD AUTO: 4.1 % — SIGNIFICANT CHANGE UP (ref 0–6)
ESTIMATED AVERAGE GLUCOSE: >398 MG/DL — HIGH (ref 68–114)
GLUCOSE BLDC GLUCOMTR-MCNC: 245 MG/DL — HIGH (ref 70–99)
GLUCOSE BLDC GLUCOMTR-MCNC: 259 MG/DL — HIGH (ref 70–99)
GLUCOSE BLDC GLUCOMTR-MCNC: 260 MG/DL — HIGH (ref 70–99)
GLUCOSE BLDC GLUCOMTR-MCNC: 283 MG/DL — HIGH (ref 70–99)
GLUCOSE SERPL-MCNC: 203 MG/DL — HIGH (ref 70–99)
HCT VFR BLD CALC: 40.6 % — SIGNIFICANT CHANGE UP (ref 34.5–45)
HGB BLD-MCNC: 12.8 G/DL — SIGNIFICANT CHANGE UP (ref 11.5–15.5)
IMM GRANULOCYTES NFR BLD AUTO: 0 % — SIGNIFICANT CHANGE UP (ref 0–1.5)
LYMPHOCYTES # BLD AUTO: 1.06 K/UL — SIGNIFICANT CHANGE UP (ref 1–3.3)
LYMPHOCYTES # BLD AUTO: 35.8 % — SIGNIFICANT CHANGE UP (ref 13–44)
MAGNESIUM SERPL-MCNC: 1.8 MG/DL — SIGNIFICANT CHANGE UP (ref 1.6–2.6)
MCHC RBC-ENTMCNC: 28.5 PG — SIGNIFICANT CHANGE UP (ref 27–34)
MCHC RBC-ENTMCNC: 31.5 GM/DL — LOW (ref 32–36)
MCV RBC AUTO: 90.4 FL — SIGNIFICANT CHANGE UP (ref 80–100)
MONOCYTES # BLD AUTO: 0.28 K/UL — SIGNIFICANT CHANGE UP (ref 0–0.9)
MONOCYTES NFR BLD AUTO: 9.5 % — SIGNIFICANT CHANGE UP (ref 2–14)
NEUTROPHILS # BLD AUTO: 1.47 K/UL — LOW (ref 1.8–7.4)
NEUTROPHILS NFR BLD AUTO: 49.6 % — SIGNIFICANT CHANGE UP (ref 43–77)
NRBC # BLD: 0 /100 WBCS — SIGNIFICANT CHANGE UP (ref 0–0)
PHOSPHATE SERPL-MCNC: 3.2 MG/DL — SIGNIFICANT CHANGE UP (ref 2.5–4.5)
PLATELET # BLD AUTO: 237 K/UL — SIGNIFICANT CHANGE UP (ref 150–400)
POTASSIUM SERPL-MCNC: 4 MMOL/L — SIGNIFICANT CHANGE UP (ref 3.5–5.3)
POTASSIUM SERPL-SCNC: 4 MMOL/L — SIGNIFICANT CHANGE UP (ref 3.5–5.3)
PROT SERPL-MCNC: 5.8 G/DL — LOW (ref 6–8.3)
RBC # BLD: 4.49 M/UL — SIGNIFICANT CHANGE UP (ref 3.8–5.2)
RBC # FLD: 12.6 % — SIGNIFICANT CHANGE UP (ref 10.3–14.5)
SODIUM SERPL-SCNC: 138 MMOL/L — SIGNIFICANT CHANGE UP (ref 135–145)
SPECIMEN SOURCE: SIGNIFICANT CHANGE UP
WBC # BLD: 2.96 K/UL — LOW (ref 3.8–10.5)
WBC # FLD AUTO: 2.96 K/UL — LOW (ref 3.8–10.5)

## 2021-01-24 PROCEDURE — 99238 HOSP IP/OBS DSCHRG MGMT 30/<: CPT | Mod: GC

## 2021-01-24 RX ORDER — ATORVASTATIN CALCIUM 80 MG/1
0 TABLET, FILM COATED ORAL
Qty: 0 | Refills: 0 | DISCHARGE

## 2021-01-24 RX ORDER — ATORVASTATIN CALCIUM 80 MG/1
1 TABLET, FILM COATED ORAL
Qty: 30 | Refills: 0
Start: 2021-01-24 | End: 2021-02-22

## 2021-01-24 RX ORDER — LISINOPRIL 2.5 MG/1
1 TABLET ORAL
Qty: 15 | Refills: 0
Start: 2021-01-24 | End: 2021-02-07

## 2021-01-24 RX ORDER — INSULIN LISPRO 100/ML
4 VIAL (ML) SUBCUTANEOUS
Qty: 180 | Refills: 0
Start: 2021-01-24 | End: 2021-02-07

## 2021-01-24 RX ORDER — INSULIN GLARGINE 100 [IU]/ML
18 INJECTION, SOLUTION SUBCUTANEOUS
Qty: 270 | Refills: 0
Start: 2021-01-24 | End: 2021-02-07

## 2021-01-24 RX ORDER — INSULIN ASPART 100 [IU]/ML
4 INJECTION, SOLUTION SUBCUTANEOUS
Qty: 180 | Refills: 0
Start: 2021-01-24 | End: 2021-02-07

## 2021-01-24 RX ADMIN — Medication 4 UNIT(S): at 11:57

## 2021-01-24 RX ADMIN — Medication 4 UNIT(S): at 08:17

## 2021-01-24 RX ADMIN — Medication 3: at 11:56

## 2021-01-24 RX ADMIN — AMLODIPINE BESYLATE 10 MILLIGRAM(S): 2.5 TABLET ORAL at 05:49

## 2021-01-24 RX ADMIN — INSULIN GLARGINE 15 UNIT(S): 100 INJECTION, SOLUTION SUBCUTANEOUS at 21:06

## 2021-01-24 RX ADMIN — PANTOPRAZOLE SODIUM 40 MILLIGRAM(S): 20 TABLET, DELAYED RELEASE ORAL at 05:49

## 2021-01-24 RX ADMIN — LISINOPRIL 5 MILLIGRAM(S): 2.5 TABLET ORAL at 05:49

## 2021-01-24 RX ADMIN — Medication 4 UNIT(S): at 17:21

## 2021-01-24 RX ADMIN — ENOXAPARIN SODIUM 40 MILLIGRAM(S): 100 INJECTION SUBCUTANEOUS at 11:56

## 2021-01-24 RX ADMIN — Medication 3: at 17:20

## 2021-01-24 RX ADMIN — Medication 2: at 08:17

## 2021-01-24 RX ADMIN — ATORVASTATIN CALCIUM 80 MILLIGRAM(S): 80 TABLET, FILM COATED ORAL at 21:02

## 2021-01-24 NOTE — DISCHARGE NOTE PROVIDER - NSDCFUADDAPPT_GEN_ALL_CORE_FT
- Please take your medications as prescribed ( Lantus 18units at bedtime and Novolog 4units pre meals) and followup with Dr. Hannah outpatient.

## 2021-01-24 NOTE — DIETITIAN INITIAL EVALUATION ADULT. - FACTORS AFF FOOD INTAKE
residual left sided weakness, CVA, hyperglycemia, new onset type 2 diabetes/other (specify) residual left sided weakness, CVA, hyperglycemia, new onset type 2 diabetes/pain/other (specify)

## 2021-01-24 NOTE — DIETITIAN INITIAL EVALUATION ADULT. - PERTINENT LABORATORY DATA
01-24 Na138 mmol/L Glu 203 mg/dL<H> K+ 4.0 mmol/L Cr  0.69 mg/dL BUN 12 mg/dL 01-24 Phos 3.2 mg/dL 01-24 Alb 2.3 g/dL<L> 01-22 Chol 168 mg/dL LDL --    HDL 91 mg/dL Trig 60 mg/dL

## 2021-01-24 NOTE — DISCHARGE NOTE PROVIDER - CARE PROVIDER_API CALL
Kianna Hannah)  EndocrinologyMetabDiabetes  [address] [place] [address]  Phone: [phone]  Fax: [phone]  Follow Up Time:     Nguyen Dykes)  Clinical Neurophysiology; Neurology  [address] [place] [address], [address] [place] [address]  Phone: [phone]  Fax: [phone]  Follow Up Time:

## 2021-01-24 NOTE — PROGRESS NOTE ADULT - PROBLEM SELECTOR PLAN 1
Presented with hyperglycemia  HBA1C is   F/u GADA, C peptide, Islet cell antibody    -AM glucagon level - r/o glucagonoma  - Endo consult Dr Hannah ; recommended 15units of lantus and 4 units premeals   - Blood suger went up to 355 overnight  - Patient will need for insulin tx upon discharge, She is reluctant but agreed Presented with hyperglycemia  HBA1C is   F/u GADA, C peptide, Islet cell antibody    F/u cytogenitics for hematochromatosis   -AM glucagon level - r/o glucagonoma  - Endo consult Dr Hannah ; recommended 15units of lantus and 4 units premeals   - Blood suger went up to 355 overnight  - Patient will need for insulin tx upon discharge, She is reluctant but agreed

## 2021-01-24 NOTE — PROGRESS NOTE ADULT - SUBJECTIVE AND OBJECTIVE BOX
PGY-1 Progress Note discussed with attending    PAGER #: [-----] TILL 5:00 PM  PLEASE CONTACT ON CALL TEAM:  - On Call Team (Please refer to Eder) FROM 5:00 PM - 8:30PM  - Nightfloat Team FROM 8:30 -7:30 AM    INTERVAL HPI/OVERNIGHT EVENTS: No acute events overnight. Blood suger elevated to 245    MEDICATIONS:  amLODIPine   Tablet 10 milliGRAM(s) Oral daily  atorvastatin 80 milliGRAM(s) Oral at bedtime  enoxaparin Injectable 40 milliGRAM(s) SubCutaneous daily  insulin glargine Injectable (LANTUS) 15 Unit(s) SubCutaneous at bedtime  insulin lispro (ADMELOG) corrective regimen sliding scale   SubCutaneous three times a day before meals  insulin lispro Injectable (ADMELOG) 4 Unit(s) SubCutaneous three times a day before meals  lisinopril 5 milliGRAM(s) Oral daily  pantoprazole    Tablet 40 milliGRAM(s) Oral before breakfast      REVIEW OF SYSTEMS:  CONSTITUTIONAL: No fever, weight loss, or fatigue  RESPIRATORY: No cough, wheezing, chills or hemoptysis; No shortness of breath  CARDIOVASCULAR: No chest pain, palpitations, dizziness, or leg swelling  GASTROINTESTINAL: No abdominal pain. No nausea, vomiting, or hematemesis; No diarrhea or constipation. No melena or hematochezia.  GENITOURINARY: No dysuria or hematuria, urinary frequency  NEUROLOGICAL: No headaches, memory loss, loss of strength, numbness, or tremors  SKIN: No itching, burning, rashes, or lesions     Vital Signs Last 24 Hrs  T(C): 36.6 (24 Jan 2021 05:05), Max: 37 (23 Jan 2021 14:17)  T(F): 97.9 (24 Jan 2021 05:05), Max: 98.6 (23 Jan 2021 14:17)  HR: 63 (24 Jan 2021 05:05) (63 - 70)  BP: 108/66 (24 Jan 2021 05:05) (108/66 - 135/81)  BP(mean): --  RR: 17 (24 Jan 2021 05:05) (17 - 17)  SpO2: 100% (24 Jan 2021 05:05) (100% - 100%)    PHYSICAL EXAMINATION:  GENERAL: NAD, well built  HEAD:  Atraumatic, Normocephalic  EYES:  conjunctiva and sclera clear  NECK: Supple, No JVD, Normal thyroid  CHEST/LUNG: Clear to auscultation. Clear to percussion bilaterally; No rales, rhonchi, wheezing, or rubs  HEART: Regular rate and rhythm; No murmurs, rubs, or gallops  ABDOMEN: Soft, Nontender, Nondistended; Bowel sounds present  NERVOUS SYSTEM:  Alert & Oriented X3,    EXTREMITIES:  2+ Peripheral Pulses, No clubbing, cyanosis, or edema  SKIN: warm dry                          12.8   2.96  )-----------( 237      ( 24 Jan 2021 07:42 )             40.6     01-23    141  |  106  |  10  ----------------------------<  111<H>  3.4<L>   |  28  |  0.70    Ca    8.8      23 Jan 2021 07:51  Phos  3.4     01-23  Mg     1.8     01-23    TPro  5.8<L>  /  Alb  2.4<L>  /  TBili  1.0  /  DBili  x   /  AST  17  /  ALT  24  /  AlkPhos  86  01-23    LIVER FUNCTIONS - ( 23 Jan 2021 07:51 )  Alb: 2.4 g/dL / Pro: 5.8 g/dL / ALK PHOS: 86 U/L / ALT: 24 U/L DA / AST: 17 U/L / GGT: x                   CAPILLARY BLOOD GLUCOSE      RADIOLOGY & ADDITIONAL TESTS:

## 2021-01-24 NOTE — DISCHARGE NOTE PROVIDER - NSDCMRMEDTOKEN_GEN_ALL_CORE_FT
AMLODIPINE BESYLATE 10 MG TAB: take 1 tablet by mouth once daily  ATORVASTATIN 20 MG TABLET:   insulin lispro 100 units/mL injectable solution: 4 unit(s) injectable 3 times a day (before meals)   lisinopril 5 mg oral tablet: 1 tab(s) orally once a day   Alcohol wipes:   AMLODIPINE BESYLATE 10 MG TAB: take 1 tablet by mouth once daily  ATORVASTATIN 20 MG TABLET:   glucometer:   insulin glargine: 18 unit(s) injectable once a day (at bedtime)   lancets:   lisinopril 5 mg oral tablet: 1 tab(s) orally once a day  NovoLOG 100 units/mL injectable solution: 4 unit(s) injectable 3 times a day (before meals)    Alcohol wipes:   AMLODIPINE BESYLATE 10 MG TAB: take 1 tablet by mouth once daily  glucometer:   insulin glargine: 18 unit(s) injectable once a day (at bedtime)   lancets:   Lipitor 80 mg oral tablet: 1 tab(s) orally once a day  lisinopril 5 mg oral tablet: 1 tab(s) orally once a day  NovoLOG 100 units/mL injectable solution: 4 unit(s) injectable 3 times a day (before meals)    Alcohol wipes:   AMLODIPINE BESYLATE 10 MG TAB: take 1 tablet by mouth once daily  glucometer:   insulin glargine: 18 unit(s) injectable once a day (at bedtime)   lancets:   Lipitor 80 mg oral tablet: 1 tab(s) orally once a day  lisinopril 5 mg oral tablet: 1 tab(s) orally once a day  NovoLOG 100 units/mL injectable solution: 4 unit(s) injectable 3 times a day (before meals)   physical therapy:    Alcohol wipes:   AMLODIPINE BESYLATE 10 MG TAB: take 1 tablet by mouth once daily  glucometer:   insulin glargine: 18 unit(s) injectable once a day (at bedtime)   lancets:   Lipitor 80 mg oral tablet: 1 tab(s) orally once a day  lisinopril 5 mg oral tablet: 1 tab(s) orally once a day  physical therapy:    Alcohol wipes:   AMLODIPINE BESYLATE 10 MG TAB: take 1 tablet by mouth once daily  glucometer:   lancets:   Lantus Solostar Pen 100 units/mL subcutaneous solution: 18 unit(s) subcutaneous once a day (at bedtime)   Lipitor 80 mg oral tablet: 1 tab(s) orally once a day  lisinopril 5 mg oral tablet: 1 tab(s) orally once a day  physical therapy:    Alcohol wipes:   AMLODIPINE BESYLATE 10 MG TAB: take 1 tablet by mouth once daily  glucometer:   lancets:   Lantus Solostar Pen 100 units/mL subcutaneous solution: 18 unit(s) subcutaneous once a day (at bedtime)   Lipitor 80 mg oral tablet: 1 tab(s) orally once a day  lisinopril 5 mg oral tablet: 1 tab(s) orally once a day  NovoLOG 100 units/mL injectable solution: 4 unit(s) injectable 3 times a day (before meals)   physical therapy:    Alcohol wipes:   AMLODIPINE BESYLATE 10 MG TAB: 1 each orally once a day   glucometer:   lancets:   Lantus Solostar Pen 100 units/mL subcutaneous solution: 18 unit(s) subcutaneous once a day (at bedtime)   Lipitor 80 mg oral tablet: 1 tab(s) orally once a day  lisinopril 5 mg oral tablet: 1 tab(s) orally once a day  NovoLOG 100 units/mL injectable solution: 4 unit(s) injectable 3 times a day (before meals)   physical therapy:

## 2021-01-24 NOTE — DISCHARGE NOTE PROVIDER - HOSPITAL COURSE
69 yo female with medical history of htn, CVA in 2010 (residual left sided weakness), presenting with hyperglycemia. She states that she visited Neurologist(Dr Dykes) yesterday for routine check, was found to have hyperglycemia on routine labs. She was called today by him and was asked to go to ED. She reports having increased thirst and urination for past few weeks. She denies tingling or numbness of toes and fingers. Denies weight loss, appetite change or recent illness. She denies fever, chest pain, shortness of breath, nausea, vomiting, pain or burning with urination. In hospital, Blood sugar was 500. Patient Was given 5 units of regular insulin and 10 units of lantus at bedtime. We consult endocrinologist for her who stated her on insulin. We sent HA1C, GADA, C peptide, Islet cell antibody and AM glucagon level to r/o glucagonoma. Patient was informed that she will be discharged on insulin. Her daughter is at nursing school and she will help her at home. As per attending, Patient is clinically stable for discharge and follow up with Dr. Hannah endocrinologist as outpatient.        You presented to hospital with hyperglycemia when you had routine checkup at Dr. Dykes office. you have increased thirst and urination for past few weeks but you denied tingling or numbness of toes and fingers or weight loss. In hospital, Your blood sugar was 500. That's means you have new onset Diabetes. You were given 5 units of regular insulin and 10 units of Lantus at bedtime. We consult endocrinologist. We placed you on 15units of lantus at bedtime and 4units of lispro premeals. Dr. Hannah talked to you and you were informed that you will be discharged on insulin at home. Dr. Hannah recommended 18units of lantus at bedtime and 4 units of lispro before each meal. Your blood sugar in [facility] is around 200s. We sent workup for your elevated blood sugar and still testing. Please take your medications as prescribed ( Lantus 18units at bedtime and Lispro 4units pre meals) and followup with Dr. Hannah outpatient. 71 yo female with medical history of htn, CVA in 2010 (residual left sided weakness), presenting with hyperglycemia. She states that she visited Neurologist(Dr Dykes) yesterday for routine check, was found to have hyperglycemia on routine labs. She was called today by him and was asked to go to ED. She reports having increased thirst and urination for past few weeks. She denies tingling or numbness of toes and fingers. Denies weight loss, appetite change or recent illness. She denies fever, chest pain, shortness of breath, nausea, vomiting, pain or burning with urination. In hospital, Blood sugar was 500. Patient Was given 5 units of regular insulin and 10 units of lantus at bedtime. We consult endocrinologist for her who stated her on insulin. We sent HA1C, GADA, C peptide, Islet cell antibody and AM glucagon level to r/o glucagonoma. Patient was informed that she will be discharged on insulin. Her daughter is at nursing school and she will help her at home. As per attending, Patient is clinically stable for discharge and follow up with Dr. Hannah endocrinologist as outpatient.     69 yo female with medical history of htn, CVA in 2010 (residual left sided weakness), presenting with hyperglycemia. She states that she visited Neurologist(Dr Dykes) yesterday for routine check, was found to have hyperglycemia on routine labs. She was called today by him and was asked to go to ED. She reports having increased thirst and urination for past few weeks. She denies tingling or numbness of toes and fingers. Denies weight loss, appetite change or recent illness. She denies fever, chest pain, shortness of breath, nausea, vomiting, pain or burning with urination. In hospital, Blood sugar was 500. Patient Was given 5 units of regular insulin and 10 units of lantus at bedtime. We consult endocrinologist for her who stated her on insulin. We sent HA1C, GADA, C peptide, Islet cell antibody and AM glucagon level to r/o glucagonoma. Patient was informed that she will be discharged on insulin. Her daughter is at nursing school and she will help her at home. As per attending, Patient is clinically stable for discharge and follow up with Dr. Hannah endocrinologist as outpatient.     69 yo female with medical history of htn, CVA in 2010 (residual left sided weakness), presenting with hyperglycemia. She states that she visited Neurologist(Dr Dykes) yesterday for routine check, was found to have hyperglycemia on routine labs. She was called today by him and was asked to go to ED. She reports having increased thirst and urination for past few weeks. She denies tingling or numbness of toes and fingers. Denies weight loss, appetite change or recent illness. She denies fever, chest pain, shortness of breath, nausea, vomiting, pain or burning with urination. In hospital, Blood sugar was 500.  We consulted endocrinology (Dr. Hannah) who started patient on insulin. Patient insulin needs increased to Admelog 4 U TID and 18 U of Lantus at bedtime. CT-Abdomen and pelvis on 1/23 showed no pancreatic mass or abnormality. We sent HA1C, GADA, C peptide, Islet cell antibody and AM glucagon level to r/o glucagonoma. C-peptide resulted as low (1.0). Patient was informed that she will be discharged on insulin. Her daughter is at nursing school and she will help her at home. As per attending, Patient is clinically stable for discharge and follow up with Dr. Hannah endocrinologist as outpatient.     71 yo female with medical history of htn, CVA in 2010 (residual left sided weakness), presenting with hyperglycemia. She states that she visited Neurologist(Dr Dykes) yesterday for routine check, was found to have hyperglycemia on routine labs. She was called today by him and was asked to go to ED. She reports having increased thirst and urination for past few weeks. She denies tingling or numbness of toes and fingers. Denies weight loss, appetite change or recent illness. She denies fever, chest pain, shortness of breath, nausea, vomiting, pain or burning with urination. In hospital, Blood sugar was 500 and Hb A1c over > 15.5.  We consulted endocrinology (Dr. Hannah) who started patient on insulin. Patient insulin needs increased to Admelog 4 U TID and 18 U of Lantus at bedtime. CT-Abdomen and pelvis on 1/23 showed no pancreatic mass or abnormality. We sent HA1C, GADA, C peptide, Islet cell antibody and AM glucagon level to r/o glucagonoma. C-peptide resulted as low (1.0). Patient was informed that she will be discharged on insulin. Her daughter is at nursing school and she will help her at home. As per attending, Patient is clinically stable for discharge and follow up with Dr. Hannah endocrinologist as outpatient.     71 yo female with medical history of htn, CVA in 2010 (residual left sided weakness), presenting with hyperglycemia, found to have new diabetes (A1C >15.5%). Blood sugar was 500 and Hb A1c over > 15.5.  We consulted endocrinology (Dr. Hannah) who started patient on insulin. Patient insulin was increased to Admelog 4 U TID and 18 U of Lantus at bedtime. CT-Abdomen and pelvis on 1/23 showed no pancreatic mass or abnormality. We sent HA1C, GADA, C peptide, Islet cell antibody and AM glucagon level to r/o glucagonoma. C-peptide resulted as low (1.0). Patient was informed that she will be discharged on insulin. Her daughter is at nursing school and she will help her at home. As per attending, Patient is clinically stable for discharge and follow up with Dr. Hannah endocrinologist as outpatient.

## 2021-01-24 NOTE — DISCHARGE NOTE PROVIDER - NSDCCPCAREPLAN_GEN_ALL_CORE_FT
PRINCIPAL DISCHARGE DIAGNOSIS  Diagnosis: Diabetes mellitus, new onset  Assessment and Plan of Treatment: You presented to hospital with hyperglycemia when you had routine checkup at Dr. Dykes office. you have increased thirst and urination for past few weeks but you denied tingling or numbness of toes and fingers or weight loss. In hospital, Your blood sugar was 500. That's means you have new onset Diabetes. You were given 5 units of regular insulin and 10 units of Lantus at bedtime. We consult endocrinologist. We placed you on 15units of lantus at bedtime and 4units of lispro premeals. Dr. Hannah talked to you and you were informed that you will be discharged on insulin at home. Dr. Hannah recommended 18units of lantus at bedtime and 4 units of lispro before each meal. Your blood sugar in [facility] is around 200s. We sent workup for your elevated blood sugar and still testing. CT abdomen and pelvis didn't show any Pnacreatic masses. Please take your medications as prescribed ( Lantus 18units at bedtime and Lispro 4units pre meals) and followup with Dr. Hannah outpatient.      SECONDARY DISCHARGE DIAGNOSES  Diagnosis: CVA (cerebral vascular accident)  Assessment and Plan of Treatment: You have hsitory of previous stroke causing residual left sided weakness Please continue on statin and follow up with Dr. Dykes outpatient.       Diagnosis: HTN (hypertension)  Assessment and Plan of Treatment: You have history of hypertension. your blood sugar was well controlled at hospital. Please continue on your medications as prescribed and follow up with your doctor.     PRINCIPAL DISCHARGE DIAGNOSIS  Diagnosis: Diabetes mellitus, new onset  Assessment and Plan of Treatment: You presented to hospital with hyperglycemia when you had routine checkup at Dr. Dykes office. you have increased thirst and urination for past few weeks but you denied tingling or numbness of toes and fingers or weight loss. In hospital, Your blood sugar was 500. That's means you have new onset Diabetes. You were given 5 units of regular insulin and 10 units of Lantus at bedtime. We consult endocrinologist. We placed you on 15units of lantus at bedtime and 4units of lispro premeals. Dr. Hannah talked to you and you were informed that you will be discharged on insulin at home. Dr. Hannah recommended 18units of lantus at bedtime and 4 units of Novolog before each meal. Your blood sugar in [facility] is around 200s. We sent workup for your elevated blood sugar and still testing. CT abdomen and pelvis didn't show any Pnacreatic masses. Please take your medications as prescribed ( Lantus 18units at bedtime and Novolog 4units pre meals) and followup with Dr. Hannah outpatient.      SECONDARY DISCHARGE DIAGNOSES  Diagnosis: CVA (cerebral vascular accident)  Assessment and Plan of Treatment: You have hsitory of previous stroke causing residual left sided weakness Please continue on statin and follow up with Dr. Dykes outpatient.       Diagnosis: HTN (hypertension)  Assessment and Plan of Treatment: You have history of hypertension. your blood sugar was well controlled at hospital. Please continue on your medications as prescribed and follow up with your doctor.     PRINCIPAL DISCHARGE DIAGNOSIS  Diagnosis: Diabetes mellitus, new onset  Assessment and Plan of Treatment: You presented to hospital with hyperglycemia when you had routine checkup at Dr. Dykes office. you have increased thirst and urination for past few weeks but you denied tingling or numbness of toes and fingers or weight loss. In hospital, Your blood sugar was 500. That's means you have new onset Diabetes. You were given 5 units of regular insulin and 10 units of Lantus at bedtime. We consult endocrinologist. We placed you on 15units of lantus at bedtime and 4units of lispro premeals. Dr. Hannah talked to you and you were informed that you will be discharged on insulin at home. Dr. Hannah recommended 18units of lantus at bedtime and 4 units of Novolog before each meal. Your blood sugar in [facility] is around 200s. We sent workup for your elevated blood sugar and still testing. CT abdomen and pelvis didn't show any Pnacreatic masses. Please take your medications as prescribed ( Lantus 18units at bedtime and Novolog 4units pre meals) and followup with Dr. Hannah outpatient.      SECONDARY DISCHARGE DIAGNOSES  Diagnosis: HTN (hypertension)  Assessment and Plan of Treatment: You have history of hypertension. your blood sugar was well controlled at hospital. Please continue on your medications as prescribed (note you have a NEW blood pressure medication - lisinopril, as well as amlodipine) and follow up with your doctor.    Diagnosis: CVA (cerebral vascular accident)  Assessment and Plan of Treatment: You have hsitory of previous stroke causing residual left sided weakness Please continue on statin (note change in dose to HIGHER AMOUNT 80mg daily) and follow up with Dr. Dykes outpatient.        PRINCIPAL DISCHARGE DIAGNOSIS  Diagnosis: Diabetes mellitus, new onset  Assessment and Plan of Treatment: You presented to hospital with hyperglycemia when you had routine checkup at Dr. Dykes office. you have increased thirst and urination for past few weeks but you denied tingling or numbness of toes and fingers or weight loss. In hospital, Your blood sugar was 500. That's means you have new onset Diabetes. You were given 5 units of regular insulin and 10 units of Lantus at bedtime. We consult endocrinologist. We placed you on 15units of lantus at bedtime and 4units of lispro premeals. Dr. Hannah talked to you and you were informed that you will be discharged on insulin at home. Dr. Hannah recommended 18units of lantus at bedtime and 4 units of Novolog before each meal. Your blood sugar in [facility] is around 200s. We sent workup for your elevated blood sugar and still testing. CT abdomen and pelvis didn't show any Pnacreatic masses. Please take your medications as prescribed ( Lantus 18units at bedtime and Novolog 4units pre meals) and followup with Dr. Hannah outpatient WITHIN ONE WEEK.      SECONDARY DISCHARGE DIAGNOSES  Diagnosis: HTN (hypertension)  Assessment and Plan of Treatment: You have history of hypertension. your blood sugar was well controlled at hospital. Please continue on your medications as prescribed (note you have a NEW blood pressure medication - lisinopril, as well as amlodipine) and follow up with your doctor within two weeks.    Diagnosis: CVA (cerebral vascular accident)  Assessment and Plan of Treatment: You have hsitory of previous stroke causing residual left sided weakness Please continue on statin (note change in dose to HIGHER AMOUNT 80mg daily) and follow up with Dr. Dykes outpatient.        PRINCIPAL DISCHARGE DIAGNOSIS  Diagnosis: Diabetes mellitus, new onset  Assessment and Plan of Treatment: You presented to hospital with hyperglycemia when you had routine checkup at Dr. Dykes office. you have increased thirst and urination for past few weeks but you denied tingling or numbness of toes and fingers or weight loss. In hospital, Your blood sugar was 500 and your HbA1c a measure of your blood sugar over the last 3 months was VERY high at over 15.5. That's means you have new onset Diabetes. You were given 5 units of regular insulin and 10 units of Lantus at bedtime. We consult endocrinologist. We placed you on 18  units of lantus at bedtime and 4 units of Novolog premeals. Dr. Hannah talked to you and you were informed that you will be discharged on insulin at home. Dr. Hannah recommended 18units of lantus at bedtime and 4 units of Novolog before each meal. Your blood sugar in [facility] is around 200s. We sent workup for your elevated blood sugar and still testing. CT abdomen and pelvis didn't show any Pancreatic masses. Please take your medications as prescribed ( Lantus 18units at bedtime and Novolog 4units pre meals) and followup with Dr. Hannah outpatient WITHIN ONE WEEK.      SECONDARY DISCHARGE DIAGNOSES  Diagnosis: HTN (hypertension)  Assessment and Plan of Treatment: You have history of hypertension. your blood sugar was well controlled at hospital. Please continue on your medications as prescribed (note you have a NEW blood pressure medication - lisinopril, as well as amlodipine) and follow up with your doctor within two weeks. Check your blood pressure at home. If you have headache, vision change, nausea, vomiting, dizziness or fainting please come to the emergency room.    Diagnosis: CVA (cerebral vascular accident)  Assessment and Plan of Treatment: You have hsitory of previous stroke causing residual left sided weakness Please continue on statin (note change in dose to HIGHER AMOUNT 80mg daily).  You were seen by physical therapy and you should follow up with Neurology Dr. Dykes outpatient.

## 2021-01-24 NOTE — DIETITIAN INITIAL EVALUATION ADULT. - OTHER INFO
Patient from home lives with daughter. Visited pt. alert, reports increase thirst, drinking more fluids such as lemon/lime or orange juices, also with increase urination & hunger for >1month, denies nausea/vomiting or diarrhea, able to chew/swallow without difficulty but complaints of Patient from home lives with daughter. Visited pt. alert, reports increase thirst, drinking more fluids such as lemon/lime or orange juices, also with increase urination & hunger for >1month, denies nausea/vomiting or diarrhea, able to chew/swallow without difficulty, complaints of left hand weakness, Neuro/team following. Per pt. unsure of UBW or weight loss? dosing wt. 127.3 Lbs noted. At this admission pt. dx. with new onset type 2 diabetes, followed by Endo/team, provided nutrition education & copies on "My Plate Planner" with carbohydrate counting, reviewed information provided & discussed "Healthy" snack options between meals, pt. receptive, also encourage pt. to monitor blood glucose levels at home & follow up with PCP/Endocrinologist once discharged. Per pt. consuming ~50% of meals & tolerating, obtained food preferences, updated kitchen, skin intact, no edema, d/w RN.

## 2021-01-24 NOTE — DIETITIAN INITIAL EVALUATION ADULT. - PERTINENT MEDS FT
MEDICATIONS  (STANDING):  amLODIPine   Tablet 10 milliGRAM(s) Oral daily  atorvastatin 80 milliGRAM(s) Oral at bedtime  enoxaparin Injectable 40 milliGRAM(s) SubCutaneous daily  insulin glargine Injectable (LANTUS) 15 Unit(s) SubCutaneous at bedtime  insulin lispro (ADMELOG) corrective regimen sliding scale   SubCutaneous three times a day before meals  insulin lispro Injectable (ADMELOG) 4 Unit(s) SubCutaneous three times a day before meals  lisinopril 5 milliGRAM(s) Oral daily  pantoprazole    Tablet 40 milliGRAM(s) Oral before breakfast    MEDICATIONS  (PRN):

## 2021-01-24 NOTE — PROGRESS NOTE ADULT - SUBJECTIVE AND OBJECTIVE BOX
Interval Events:  pt in nad    Allergies    No Known Allergies    Intolerances      Endocrine/Metabolic Medications:  atorvastatin 80 milliGRAM(s) Oral at bedtime  insulin glargine Injectable (LANTUS) 15 Unit(s) SubCutaneous at bedtime  insulin lispro (ADMELOG) corrective regimen sliding scale   SubCutaneous three times a day before meals  insulin lispro Injectable (ADMELOG) 4 Unit(s) SubCutaneous three times a day before meals      Vital Signs Last 24 Hrs  T(C): 36.6 (24 Jan 2021 05:05), Max: 37 (23 Jan 2021 14:17)  T(F): 97.9 (24 Jan 2021 05:05), Max: 98.6 (23 Jan 2021 14:17)  HR: 63 (24 Jan 2021 05:05) (63 - 70)  BP: 108/66 (24 Jan 2021 05:05) (108/66 - 135/81)  BP(mean): --  RR: 17 (24 Jan 2021 05:05) (17 - 17)  SpO2: 100% (24 Jan 2021 05:05) (100% - 100%)      PHYSICAL EXAM  All physical exam findings normal, except those marked:  General:	Alert, active, cooperative, NAD, well hydrated  .		[] Abnormal:  Neck		Normal: supple, no cervical adenopathy, no palpable thyroid  .		[] Abnormal:  Cardiovascular	Normal: regular rate, normal S1, S2, no murmurs  .		[] Abnormal:  Respiratory	Normal: no chest wall deformity, normal respiratory pattern, CTA B/L  .		[] Abnormal:  Abdominal	Normal: soft, ND, NT, bowel sounds present, no masses, no organomegaly  .		[] Abnormal:  		Normal normal genitalia, testes descended, circumcised/uncircumcised  .		Vivian stage:			Breast vivian:  .		Menstrual history:  .		[] Abnormal:  Extremities	Normal: FROM x4  .		[] Abnormal:  Skin		Normal: intact and not indurated, no rash, no acanthosis nigricans  .		[] Abnormal:  Neurologic	Normal: grossly intact  .		[] Abnormal:    LABS                        12.8   2.96  )-----------( 237      ( 24 Jan 2021 07:42 )             40.6                               138    |  104    |  12                  Calcium: 8.6   / iCa: x      (01-24 @ 07:42)    ----------------------------<  203       Magnesium: 1.8                              4.0     |  27     |  0.69             Phosphorous: 3.2      TPro  5.8    /  Alb  2.3    /  TBili  2.2    /  DBili  x      /  AST  22     /  ALT  24     /  AlkPhos  89     24 Jan 2021 07:42    CAPILLARY BLOOD GLUCOSE      POCT Blood Glucose.: 245 mg/dL (24 Jan 2021 07:51)  POCT Blood Glucose.: 322 mg/dL (23 Jan 2021 21:20)  POCT Blood Glucose.: 239 mg/dL (23 Jan 2021 16:50)  POCT Blood Glucose.: 165 mg/dL (23 Jan 2021 11:52)  POCT Blood Glucose.: 130 mg/dL (23 Jan 2021 11:50)        Assesment/plan    71 yo female with medical history of htn, CVA in 2010 (residual left sided weakness), presenting with hyperglycemia.  Pt admits to symptomatic hyperglycemia for past few weeks.          Problem/Recommendation - 1:  Problem: Diabetes mellitus, new onset. Recommendation: new onset symptomatic hyperglycemia  rec chnaging lantus to 18 units  cont admelog 4 ac tid  d/w pt need for insulin tx  reluctant but agreed  lives with her daughter who is a nursing student  dm teaching  nutrition eval  fsg ac and hs  d/w hs.  fine tuning as out pt

## 2021-01-24 NOTE — DISCHARGE NOTE PROVIDER - ATTENDING COMMENTS
71 yo female with medical history of htn, CVA in 2010 (residual left sided weakness), presenting with hyperglycemia. She states that she visited Neurologist(Dr Dykes) yesterday for routine check, was found to have hyperglycemia on routine  labs - diagnosed with new diabetes.    #DM - Hgb >15.5%  -started on novolog 4 and glargine 18 - checked w/pharmacy - covered  -f/u endocrinology outpt  -FLORENTINO, islet cell, glucagon f/u    #HTN  -started on lisinopril given new diabetes diagnosis - prescribed outpt    #CVA  -increased statin dose    #Dispo  -home today - prescribed outpt  PT

## 2021-01-24 NOTE — DIETITIAN INITIAL EVALUATION ADULT. - ORAL INTAKE PTA/DIET HISTORY
Seen 5/30/17 for a complete physical exam.   He is to return in 6 months.  Requesting Ambien CR 12.5 mg as needed for sleep.  PDMP was reviewed with Dr Natarajan in Dr Goodman's absence.   Last scripted 6/23/17 #5 by Dr Cheema (Dr Goodman out of the office).   Dr Natarajan will allow #15 but this must last patient until Dr Goodman returns 7/24/17.    Per pt. usually consumes 2-3 meals (regular consistency) & may occasional skips breakfast meal Per pt. usually consumes 2-3 meals (regular consistency) & may occasional skips breakfast meal.

## 2021-01-24 NOTE — DISCHARGE NOTE PROVIDER - NSDCPNSUBOBJ_GEN_ALL_CORE
Saw and examined pt today. Feels some numbness/tingling in upper and lower extremities, worse in left hand. Has full range of motion, no focal deficits. No dizziness, polyuria, polydipsia, headaches, fever, chills.    On physical exam:  Gen: NAD  Chest/CV: RRR, +2 peripheral pulses  Pulm: CTAB/L  Abd: soft, NT, ND +BS  MSK: no peripheral edema/cyanosis

## 2021-01-25 ENCOUNTER — TRANSCRIPTION ENCOUNTER (OUTPATIENT)
Age: 71
End: 2021-01-25

## 2021-01-25 VITALS
TEMPERATURE: 99 F | SYSTOLIC BLOOD PRESSURE: 115 MMHG | HEART RATE: 88 BPM | DIASTOLIC BLOOD PRESSURE: 61 MMHG | RESPIRATION RATE: 18 BRPM | OXYGEN SATURATION: 98 %

## 2021-01-25 LAB
GLUCOSE BLDC GLUCOMTR-MCNC: 209 MG/DL — HIGH (ref 70–99)
GLUCOSE BLDC GLUCOMTR-MCNC: 214 MG/DL — HIGH (ref 70–99)

## 2021-01-25 PROCEDURE — 82010 KETONE BODYS QUAN: CPT

## 2021-01-25 PROCEDURE — 80053 COMPREHEN METABOLIC PANEL: CPT

## 2021-01-25 PROCEDURE — 82803 BLOOD GASES ANY COMBINATION: CPT

## 2021-01-25 PROCEDURE — 87086 URINE CULTURE/COLONY COUNT: CPT

## 2021-01-25 PROCEDURE — 74177 CT ABD & PELVIS W/CONTRAST: CPT

## 2021-01-25 PROCEDURE — 81256 HFE GENE: CPT

## 2021-01-25 PROCEDURE — 82943 ASSAY OF GLUCAGON: CPT

## 2021-01-25 PROCEDURE — 86341 ISLET CELL ANTIBODY: CPT

## 2021-01-25 PROCEDURE — 80061 LIPID PANEL: CPT

## 2021-01-25 PROCEDURE — 84100 ASSAY OF PHOSPHORUS: CPT

## 2021-01-25 PROCEDURE — 93005 ELECTROCARDIOGRAM TRACING: CPT

## 2021-01-25 PROCEDURE — 81001 URINALYSIS AUTO W/SCOPE: CPT

## 2021-01-25 PROCEDURE — 36415 COLL VENOUS BLD VENIPUNCTURE: CPT

## 2021-01-25 PROCEDURE — 84443 ASSAY THYROID STIM HORMONE: CPT

## 2021-01-25 PROCEDURE — 83735 ASSAY OF MAGNESIUM: CPT

## 2021-01-25 PROCEDURE — 99285 EMERGENCY DEPT VISIT HI MDM: CPT

## 2021-01-25 PROCEDURE — 86803 HEPATITIS C AB TEST: CPT

## 2021-01-25 PROCEDURE — 85025 COMPLETE CBC W/AUTO DIFF WBC: CPT

## 2021-01-25 PROCEDURE — 82607 VITAMIN B-12: CPT

## 2021-01-25 PROCEDURE — 85610 PROTHROMBIN TIME: CPT

## 2021-01-25 PROCEDURE — 83036 HEMOGLOBIN GLYCOSYLATED A1C: CPT

## 2021-01-25 PROCEDURE — 82746 ASSAY OF FOLIC ACID SERUM: CPT

## 2021-01-25 PROCEDURE — 84681 ASSAY OF C-PEPTIDE: CPT

## 2021-01-25 PROCEDURE — 82962 GLUCOSE BLOOD TEST: CPT

## 2021-01-25 PROCEDURE — 86769 SARS-COV-2 COVID-19 ANTIBODY: CPT

## 2021-01-25 PROCEDURE — 82009 KETONE BODYS QUAL: CPT

## 2021-01-25 PROCEDURE — 0225U NFCT DS DNA&RNA 21 SARSCOV2: CPT

## 2021-01-25 PROCEDURE — 96360 HYDRATION IV INFUSION INIT: CPT

## 2021-01-25 RX ORDER — AMLODIPINE BESYLATE 2.5 MG/1
0 TABLET ORAL
Qty: 0 | Refills: 1 | DISCHARGE

## 2021-01-25 RX ORDER — ENOXAPARIN SODIUM 100 MG/ML
18 INJECTION SUBCUTANEOUS
Qty: 1 | Refills: 0
Start: 2021-01-25

## 2021-01-25 RX ORDER — INSULIN ASPART 100 [IU]/ML
4 INJECTION, SOLUTION SUBCUTANEOUS
Qty: 180 | Refills: 0
Start: 2021-01-25 | End: 2021-02-08

## 2021-01-25 RX ORDER — ATORVASTATIN CALCIUM 80 MG/1
1 TABLET, FILM COATED ORAL
Qty: 30 | Refills: 0
Start: 2021-01-25 | End: 2021-02-23

## 2021-01-25 RX ORDER — INSULIN GLARGINE 100 [IU]/ML
18 INJECTION, SOLUTION SUBCUTANEOUS AT BEDTIME
Refills: 0 | Status: DISCONTINUED | OUTPATIENT
Start: 2021-01-25 | End: 2021-01-25

## 2021-01-25 RX ORDER — LISINOPRIL 2.5 MG/1
1 TABLET ORAL
Qty: 15 | Refills: 0
Start: 2021-01-25 | End: 2021-02-08

## 2021-01-25 RX ORDER — AMLODIPINE BESYLATE 2.5 MG/1
1 TABLET ORAL
Qty: 30 | Refills: 1
Start: 2021-01-25 | End: 2021-03-25

## 2021-01-25 RX ADMIN — Medication 2: at 08:18

## 2021-01-25 RX ADMIN — Medication 4 UNIT(S): at 08:18

## 2021-01-25 RX ADMIN — LISINOPRIL 5 MILLIGRAM(S): 2.5 TABLET ORAL at 05:11

## 2021-01-25 RX ADMIN — ENOXAPARIN SODIUM 40 MILLIGRAM(S): 100 INJECTION SUBCUTANEOUS at 11:54

## 2021-01-25 RX ADMIN — Medication 4 UNIT(S): at 11:54

## 2021-01-25 RX ADMIN — Medication 2: at 11:54

## 2021-01-25 RX ADMIN — PANTOPRAZOLE SODIUM 40 MILLIGRAM(S): 20 TABLET, DELAYED RELEASE ORAL at 05:11

## 2021-01-25 NOTE — PROGRESS NOTE ADULT - PROBLEM SELECTOR PLAN 1
Presented with hyperglycemia  HBA1C is   F/u GADA, C peptide, Islet cell antibody    F/u cytogenitics for hematochromatosis   -AM glucagon level - r/o glucagonoma  - Endo consult Dr Hannah ; recommended 15units of lantus and 4 units premeals   - Blood suger went up to 355 overnight  - Patient will need for insulin tx upon discharge, She is reluctant but agreed

## 2021-01-25 NOTE — PROGRESS NOTE ADULT - SUBJECTIVE AND OBJECTIVE BOX
PGY-1 Progress Note discussed with attending    PAGER #: [[phone]] TILL 5:00 PM  PLEASE CONTACT ON CALL TEAM:   - On Call Team (Please refer to Eder) FROM 5:00 PM - 8:30PM  - Nightfloat Team FROM 8:30 -7:30 AM    CHIEF COMPLAINT & BRIEF HOSPITAL COURSE:      INTERVAL HPI/OVERNIGHT EVENTS:       REVIEW OF SYSTEMS:  CONSTITUTIONAL: No fever, weight loss, or fatigue  RESPIRATORY: No cough, wheezing, chills or hemoptysis; No shortness of breath  CARDIOVASCULAR: No chest pain, palpitations, dizziness, or leg swelling  GASTROINTESTINAL: No abdominal pain. No nausea, vomiting, or hematemesis; No diarrhea or constipation. No melena or hematochezia.  GENITOURINARY: No dysuria or hematuria, urinary frequency  NEUROLOGICAL: No headaches, memory loss, loss of strength, numbness, or tremors  SKIN: No itching, burning, rashes, or lesions     MEDICATIONS  (STANDING):  amLODIPine   Tablet 10 milliGRAM(s) Oral daily  atorvastatin 80 milliGRAM(s) Oral at bedtime  enoxaparin Injectable 40 milliGRAM(s) SubCutaneous daily  insulin glargine Injectable (LANTUS) 15 Unit(s) SubCutaneous at bedtime  insulin lispro (ADMELOG) corrective regimen sliding scale   SubCutaneous three times a day before meals  insulin lispro Injectable (ADMELOG) 4 Unit(s) SubCutaneous three times a day before meals  lisinopril 5 milliGRAM(s) Oral daily  pantoprazole    Tablet 40 milliGRAM(s) Oral before breakfast    MEDICATIONS  (PRN):      Vital Signs Last 24 Hrs  T(C): 36.5 (25 Jan 2021 05:10), Max: 36.6 (24 Jan 2021 14:59)  T(F): 97.7 (25 Jan 2021 05:10), Max: 97.9 (24 Jan 2021 14:59)  HR: 60 (25 Jan 2021 05:10) (60 - 87)  BP: 103/54 (25 Jan 2021 05:10) (103/54 - 109/60)  BP(mean): --  RR: 18 (25 Jan 2021 05:10) (17 - 18)  SpO2: 100% (25 Jan 2021 05:10) (94% - 100%)    PHYSICAL EXAMINATION:  GENERAL: NAD, well built  HEAD:  Atraumatic, Normocephalic  EYES:  conjunctiva and sclera clear  NECK: Supple, No JVD, Normal thyroid  CHEST/LUNG: Clear to auscultation. Clear to percussion bilaterally; No rales, rhonchi, wheezing, or rubs  HEART: Regular rate and rhythm; No murmurs, rubs, or gallops  ABDOMEN: Soft, Nontender, Nondistended; Bowel sounds present  NERVOUS SYSTEM:  Alert & Oriented X3,    EXTREMITIES:  2+ Peripheral Pulses, No clubbing, cyanosis, or edema  SKIN: warm dry                          12.8   2.96  )-----------( 237      ( 24 Jan 2021 07:42 )             40.6     01-24    138  |  104  |  12  ----------------------------<  203<H>  4.0   |  27  |  0.69    Ca    8.6      24 Jan 2021 07:42  Phos  3.2     01-24  Mg     1.8     01-24    TPro  5.8<L>  /  Alb  2.3<L>  /  TBili  2.2<H>  /  DBili  x   /  AST  22  /  ALT  24  /  AlkPhos  89  01-24    LIVER FUNCTIONS - ( 24 Jan 2021 07:42 )  Alb: 2.3 g/dL / Pro: 5.8 g/dL / ALK PHOS: 89 U/L / ALT: 24 U/L DA / AST: 22 U/L / GGT: x                       I&O's Summary      CAPILLARY BLOOD GLUCOSE      POCT Blood Glucose.: 209 mg/dL (25 Jan 2021 07:56)    CAPILLARY BLOOD GLUCOSE      POCT Blood Glucose.: 209 mg/dL (25 Jan 2021 07:56)  POCT Blood Glucose.: 283 mg/dL (24 Jan 2021 21:04)  POCT Blood Glucose.: 260 mg/dL (24 Jan 2021 16:34)  POCT Blood Glucose.: 259 mg/dL (24 Jan 2021 11:32)      RADIOLOGY & ADDITIONAL TESTS:                   PGY-1 Progress Note discussed with attending    PAGER #: [[phone]] TILL 5:00 PM  PLEASE CONTACT ON CALL TEAM:   - On Call Team (Please refer to Eder) FROM 5:00 PM - 8:30PM  - Nightfloat Team FROM 8:30 -7:30 AM    CHIEF COMPLAINT & BRIEF HOSPITAL COURSE:  71 yo female with medical history of htn, CVA in 2010 (residual left sided weakness), presenting with hyperglycemia. She states that she visited Neurologist(Dr Dykes) yesterday for routine check, was found to have hyperglycemia on routine labs. She was called today by him and was asked to go to ED. She reports having increased thirst and urination for past few weeks. She denies tingling or numbness of toes and fingers. Denies weight loss, appetite change or recent illness. She denies fever, chest pain, shortness of breath, nausea, vomiting, pain or burning with urination. In hospital, Blood sugar was 500.  We consulted endocrinology (Dr. Hannah) who started patient on insulin. Patient insulin needs increased to Admelog 4 U TID and 15 U of Lantus at bedtime. CT-Abdomen and pelvis on 1/23 showed no pancreatic mass or abnormality. We sent HA1C, GADA, C peptide, Islet cell antibody and AM glucagon level to r/o glucagonoma. C-peptide resulted as low (1.0). Patient was informed that she will be discharged on insulin. Her daughter is at nursing school and she will help her at home. As per attending, Patient is clinically stable for discharge and follow up with Dr. Hannah endocrinologist as outpatient.    INTERVAL HPI/OVERNIGHT EVENTS:   Patient Lantus 15 and Admelog 4 TID. BG remains in 200's. Given 10 U sliding scale in last 24 hours. Patient seen at bedside, eating breakfast with good appetite. No complaints this AM besides chronic left hand neuropathy unchanged since last stroke.     REVIEW OF SYSTEMS:  CONSTITUTIONAL: No fever, weight loss, or fatigue  RESPIRATORY: No cough, wheezing, chills or hemoptysis; No shortness of breath  CARDIOVASCULAR: No chest pain, palpitations, dizziness, or leg swelling  GASTROINTESTINAL: No abdominal pain. No nausea, vomiting, or hematemesis; No diarrhea or constipation. No melena or hematochezia.  GENITOURINARY: No dysuria or hematuria, + urinary frequency, + thirst  NEUROLOGICAL: No headaches, memory loss, loss of strength, numbness, or tremors + CVA with L deficit and neuropathic pain  SKIN: No itching, burning, rashes, or lesions     MEDICATIONS  (STANDING):  amLODIPine   Tablet 10 milliGRAM(s) Oral daily  atorvastatin 80 milliGRAM(s) Oral at bedtime  enoxaparin Injectable 40 milliGRAM(s) SubCutaneous daily  insulin glargine Injectable (LANTUS) 15 Unit(s) SubCutaneous at bedtime  insulin lispro (ADMELOG) corrective regimen sliding scale   SubCutaneous three times a day before meals  insulin lispro Injectable (ADMELOG) 4 Unit(s) SubCutaneous three times a day before meals  lisinopril 5 milliGRAM(s) Oral daily  pantoprazole    Tablet 40 milliGRAM(s) Oral before breakfast    MEDICATIONS  (PRN):      Vital Signs Last 24 Hrs  T(C): 36.5 (25 Jan 2021 05:10), Max: 36.6 (24 Jan 2021 14:59)  T(F): 97.7 (25 Jan 2021 05:10), Max: 97.9 (24 Jan 2021 14:59)  HR: 60 (25 Jan 2021 05:10) (60 - 87)  BP: 103/54 (25 Jan 2021 05:10) (103/54 - 109/60)  BP(mean): --  RR: 18 (25 Jan 2021 05:10) (17 - 18)  SpO2: 100% (25 Jan 2021 05:10) (94% - 100%)    PHYSICAL EXAMINATION:  GENERAL: NAD, well built  HEAD:  Atraumatic, Normocephalic  EYES:  conjunctiva and sclera clear  NECK: Supple, No JVD  CHEST/LUNG: Clear to auscultation ; No rales, rhonchi, wheezing, or rubs  HEART: Regular rate and rhythm; No murmurs, rubs, or gallops  ABDOMEN: Soft, Nontender, Nondistended; Bowel sounds present  NERVOUS SYSTEM:  Alert & Oriented X3   EXTREMITIES:  2+ Peripheral Pulses, No clubbing, cyanosis, or edema  SKIN: warm dry                          12.8   2.96  )-----------( 237      ( 24 Jan 2021 07:42 )             40.6     01-24    138  |  104  |  12  ----------------------------<  203<H>  4.0   |  27  |  0.69    Ca    8.6      24 Jan 2021 07:42  Phos  3.2     01-24  Mg     1.8     01-24    TPro  5.8<L>  /  Alb  2.3<L>  /  TBili  2.2<H>  /  DBili  x   /  AST  22  /  ALT  24  /  AlkPhos  89  01-24    LIVER FUNCTIONS - ( 24 Jan 2021 07:42 )  Alb: 2.3 g/dL / Pro: 5.8 g/dL / ALK PHOS: 89 U/L / ALT: 24 U/L DA / AST: 22 U/L / GGT: x           I&O's Summary      CAPILLARY BLOOD GLUCOSE  POCT Blood Glucose.: 209 mg/dL (25 Jan 2021 07:56)    CAPILLARY BLOOD GLUCOSE  POCT Blood Glucose.: 209 mg/dL (25 Jan 2021 07:56)  POCT Blood Glucose.: 283 mg/dL (24 Jan 2021 21:04)  POCT Blood Glucose.: 260 mg/dL (24 Jan 2021 16:34)  POCT Blood Glucose.: 259 mg/dL (24 Jan 2021 11:32)      RADIOLOGY & ADDITIONAL TESTS:    < from: CT Abdomen and Pelvis w/ IV Cont (01.23.21 @ 11:51) >    EXAM:  CT ABDOMEN AND PELVIS IC                            PROCEDURE DATE:  01/23/2021          INTERPRETATION:  CLINICAL INFORMATION: New onset diabetes. Evaluate for pancreatic abnormality.    COMPARISON: None.    PROCEDURE:  CT of the Abdomen and Pelvis was performed with intravenous contrast.  Arterial and Portal Venous phases were acquired.  Intravenous contrast: 90 ml Omnipaque 350. 10 ml discarded.  Oral contrast: Water was administered.  Sagittal and coronal reformats were performed.    FINDINGS:  LOWER CHEST: Small dependent/layering pleural effusions. 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    LIVER: Within normal limits.  BILE DUCTS: Normal caliber.  GALLBLADDER: Within normal limits.  SPLEEN: Within normal limits.  PANCREAS: No enhancing or cystic pancreatic masses. No pancreatic inflammation or duct dilatation.  ADRENALS: Within normal limits.  KIDNEYS/URETERS: Within normal limits.    BLADDER: Within normal limits.  REPRODUCTIVE ORGANS: Uterus and adnexa within normal limits. Trace free pelvic fluid    BOWEL: No bowel obstruction. Appendix is normal. Moderate colonic stool burden  PERITONEUM: No ascites.  VESSELS: Atherosclerotic changes  RETROPERITONEUM/LYMPH NODES: No lymphadenopathy.  ABDOMINAL WALL: Rectus diastases. Minimal anasarca  BONES: L4-5 spondylosis    IMPRESSION:  1.  No pancreatic mass/abnormality or acute abdominal/pelvic pathology      GRIS ALAS MD; Attending Radiologist  This document has been electronically signed. Jan 23 2021  3:21PM    < end of copied text >

## 2021-01-25 NOTE — PROGRESS NOTE ADULT - ASSESSMENT
69 yo female with medical history of htn, CVA in 2010 (residual left sided weakness), presenting with hyperglycemia, found to have new diabetes.    #Diabetes  -A1C pending - came in with high FSBG, no gap  -A1C, FSBG, HISS   -lantus/ademelog per endo  -pending initial diabetes workup - FLORENTINO ab, islet cell antibodies, glucagon level, etc.  -pt has hx diabetes - likely T2DM, however iwll order CT A/P to r/o pancreatic mass  -will need outpt f/u, education    #HTN  -on amlodipine - will start liisnopril ACEI given new diabetes diagnosis    #CVA  -increase atorvastatin dosage    #DVT PPX  -lovenox    Kya Yanez MD  Division of Hospital Medicine  
69 yo female with medical history of htn, CVA in 2010 (residual left sided weakness), presenting with hyperglycemia.    ED:  BS-558    Patient is being admitted to medicine for new onset diabetes.
69 yo female with medical history of htn, CVA in 2010 (residual left sided weakness), presenting with hyperglycemia.    ED:  BS-558    Patient is being admitted to medicine for new onset diabetes.
71 yo female with medical history of htn, CVA in 2010 (residual left sided weakness), presenting with hyperglycemia.    ED:  BS-558    Patient is being admitted to medicine for new onset diabetes.

## 2021-01-25 NOTE — DISCHARGE NOTE NURSING/CASE MANAGEMENT/SOCIAL WORK - PATIENT PORTAL LINK FT
You can access the FollowMyHealth Patient Portal offered by [facility] by registering at the following website: [email or web address]. By joining [employer]’s FollowMyHealth portal, you will also be able to view your health information using other applications (apps) compatible with our system.

## 2021-01-25 NOTE — PROGRESS NOTE ADULT - SUBJECTIVE AND OBJECTIVE BOX
Interval Events:  pt in nad    Allergies    No Known Allergies    Intolerances      Endocrine/Metabolic Medications:  atorvastatin 80 milliGRAM(s) Oral at bedtime  insulin glargine Injectable (LANTUS) 18 Unit(s) SubCutaneous at bedtime  insulin lispro (ADMELOG) corrective regimen sliding scale   SubCutaneous three times a day before meals  insulin lispro Injectable (ADMELOG) 4 Unit(s) SubCutaneous three times a day before meals      Vital Signs Last 24 Hrs  T(C): 36.5 (25 Jan 2021 05:10), Max: 36.6 (24 Jan 2021 14:59)  T(F): 97.7 (25 Jan 2021 05:10), Max: 97.9 (24 Jan 2021 14:59)  HR: 60 (25 Jan 2021 05:10) (60 - 87)  BP: 103/54 (25 Jan 2021 05:10) (103/54 - 109/60)  BP(mean): --  RR: 18 (25 Jan 2021 05:10) (17 - 18)  SpO2: 100% (25 Jan 2021 05:10) (94% - 100%)  Height (cm): 165.1 (01-24 @ 11:19)  Weight (kg): 57.9 (01-24 @ 11:19)  BMI (kg/m2): 21.2 (01-24 @ 11:19)    PHYSICAL EXAM  All physical exam findings normal, except those marked:  General:	Alert, active, cooperative, NAD, well hydrated  .		[] Abnormal:  Neck		Normal: supple, no cervical adenopathy, no palpable thyroid  .		[] Abnormal:  Cardiovascular	Normal: regular rate, normal S1, S2, no murmurs  .		[] Abnormal:  Respiratory	Normal: no chest wall deformity, normal respiratory pattern, CTA B/L  .		[] Abnormal:  Abdominal	Normal: soft, ND, NT, bowel sounds present, no masses, no organomegaly  .		[] Abnormal:  		Normal normal genitalia, testes descended, circumcised/uncircumcised  .		Vivian stage:			Breast vivian:  .		Menstrual history:  .		[] Abnormal:  Extremities	Normal: FROM x4  .		[] Abnormal:  Skin		Normal: intact and not indurated, no rash, no acanthosis nigricans  .		[] Abnormal:  Neurologic	Normal: grossly intact  .		[] Abnormal:    LABS        CAPILLARY BLOOD GLUCOSE      POCT Blood Glucose.: 209 mg/dL (25 Jan 2021 07:56)  POCT Blood Glucose.: 283 mg/dL (24 Jan 2021 21:04)  POCT Blood Glucose.: 260 mg/dL (24 Jan 2021 16:34)  POCT Blood Glucose.: 259 mg/dL (24 Jan 2021 11:32)        Assesment/plan      Problem: Diabetes mellitus, new onset. Recommendation: new onset symptomatic hyperglycemia  rec changing lantus to 18 units  cont admelog 4 ac tid  d/w pt need for insulin tx  reluctant but agreed  lives with her daughter who is a nursing student  dm teaching  nutrition eval  fsg ac and hs  d/w hs.  fine tuning as out pt

## 2021-01-25 NOTE — PROGRESS NOTE ADULT - PROBLEM SELECTOR PLAN 3
Has h/o HTn  c/w home meds with parameters  Monitor Bp

## 2021-01-25 NOTE — PROGRESS NOTE ADULT - PROBLEM SELECTOR PLAN 4
Lovenox for dvt prophylaxis  On ppi for gi prophylaxis

## 2021-01-25 NOTE — PROGRESS NOTE ADULT - PROBLEM SELECTOR PLAN 2
Has h/o CVA  has residual left sided weakness  c/w statin

## 2021-01-27 LAB — ISLET CELL512 AB SER-ACNC: SIGNIFICANT CHANGE UP

## 2021-01-28 ENCOUNTER — EMERGENCY (EMERGENCY)
Facility: HOSPITAL | Age: 71
LOS: 1 days | Discharge: HOME | End: 2021-01-28
Attending: EMERGENCY MEDICINE
Payer: COMMERCIAL

## 2021-01-28 VITALS
RESPIRATION RATE: 16 BRPM | TEMPERATURE: 98 F | HEIGHT: 65 IN | HEART RATE: 76 BPM | OXYGEN SATURATION: 100 % | SYSTOLIC BLOOD PRESSURE: 143 MMHG | DIASTOLIC BLOOD PRESSURE: 85 MMHG | WEIGHT: 127.21 LBS

## 2021-01-28 VITALS
SYSTOLIC BLOOD PRESSURE: 116 MMHG | HEART RATE: 61 BPM | RESPIRATION RATE: 16 BRPM | DIASTOLIC BLOOD PRESSURE: 74 MMHG | OXYGEN SATURATION: 98 % | TEMPERATURE: 97 F

## 2021-01-28 LAB
ACETONE SERPL-MCNC: NEGATIVE — SIGNIFICANT CHANGE UP
ALBUMIN SERPL ELPH-MCNC: 2.7 G/DL — LOW (ref 3.5–5)
ALP SERPL-CCNC: 113 U/L — SIGNIFICANT CHANGE UP (ref 40–120)
ALT FLD-CCNC: 39 U/L DA — SIGNIFICANT CHANGE UP (ref 10–60)
ANION GAP SERPL CALC-SCNC: 5 MMOL/L — SIGNIFICANT CHANGE UP (ref 5–17)
APPEARANCE UR: CLEAR — SIGNIFICANT CHANGE UP
AST SERPL-CCNC: 33 U/L — SIGNIFICANT CHANGE UP (ref 10–40)
BASE EXCESS BLDV CALC-SCNC: 1.4 MMOL/L — SIGNIFICANT CHANGE UP (ref -2–2)
BASOPHILS # BLD AUTO: 0.02 K/UL — SIGNIFICANT CHANGE UP (ref 0–0.2)
BASOPHILS NFR BLD AUTO: 0.7 % — SIGNIFICANT CHANGE UP (ref 0–2)
BILIRUB SERPL-MCNC: 0.9 MG/DL — SIGNIFICANT CHANGE UP (ref 0.2–1.2)
BILIRUB UR-MCNC: NEGATIVE — SIGNIFICANT CHANGE UP
BLOOD GAS COMMENTS, VENOUS: SIGNIFICANT CHANGE UP
BUN SERPL-MCNC: 10 MG/DL — SIGNIFICANT CHANGE UP (ref 7–18)
CALCIUM SERPL-MCNC: 8.8 MG/DL — SIGNIFICANT CHANGE UP (ref 8.4–10.5)
CHLORIDE SERPL-SCNC: 108 MMOL/L — SIGNIFICANT CHANGE UP (ref 96–108)
CO2 SERPL-SCNC: 26 MMOL/L — SIGNIFICANT CHANGE UP (ref 22–31)
COLOR SPEC: YELLOW — SIGNIFICANT CHANGE UP
CREAT SERPL-MCNC: 0.79 MG/DL — SIGNIFICANT CHANGE UP (ref 0.5–1.3)
DIFF PNL FLD: NEGATIVE — SIGNIFICANT CHANGE UP
EOSINOPHIL # BLD AUTO: 0.11 K/UL — SIGNIFICANT CHANGE UP (ref 0–0.5)
EOSINOPHIL NFR BLD AUTO: 3.6 % — SIGNIFICANT CHANGE UP (ref 0–6)
GAD65 AB SER-MCNC: 0 NMOL/L — SIGNIFICANT CHANGE UP
GLUCOSE SERPL-MCNC: 321 MG/DL — HIGH (ref 70–99)
GLUCOSE UR QL: 1000 MG/DL
HCO3 BLDV-SCNC: 26 MMOL/L — SIGNIFICANT CHANGE UP (ref 21–29)
HCT VFR BLD CALC: 32.6 % — LOW (ref 34.5–45)
HGB BLD-MCNC: 10.2 G/DL — LOW (ref 11.5–15.5)
HOROWITZ INDEX BLDV+IHG-RTO: 21 — SIGNIFICANT CHANGE UP
IMM GRANULOCYTES NFR BLD AUTO: 0 % — SIGNIFICANT CHANGE UP (ref 0–1.5)
KETONES UR-MCNC: NEGATIVE — SIGNIFICANT CHANGE UP
LEUKOCYTE ESTERASE UR-ACNC: ABNORMAL
LYMPHOCYTES # BLD AUTO: 0.94 K/UL — LOW (ref 1–3.3)
LYMPHOCYTES # BLD AUTO: 31.1 % — SIGNIFICANT CHANGE UP (ref 13–44)
MCHC RBC-ENTMCNC: 29 PG — SIGNIFICANT CHANGE UP (ref 27–34)
MCHC RBC-ENTMCNC: 31.3 GM/DL — LOW (ref 32–36)
MCV RBC AUTO: 92.6 FL — SIGNIFICANT CHANGE UP (ref 80–100)
MONOCYTES # BLD AUTO: 0.35 K/UL — SIGNIFICANT CHANGE UP (ref 0–0.9)
MONOCYTES NFR BLD AUTO: 11.6 % — SIGNIFICANT CHANGE UP (ref 2–14)
NEUTROPHILS # BLD AUTO: 1.6 K/UL — LOW (ref 1.8–7.4)
NEUTROPHILS NFR BLD AUTO: 53 % — SIGNIFICANT CHANGE UP (ref 43–77)
NITRITE UR-MCNC: NEGATIVE — SIGNIFICANT CHANGE UP
NRBC # BLD: 0 /100 WBCS — SIGNIFICANT CHANGE UP (ref 0–0)
PCO2 BLDV: 44 MMHG — SIGNIFICANT CHANGE UP (ref 35–50)
PH BLDV: 7.39 — SIGNIFICANT CHANGE UP (ref 7.35–7.45)
PH UR: 7 — SIGNIFICANT CHANGE UP (ref 5–8)
PLATELET # BLD AUTO: 242 K/UL — SIGNIFICANT CHANGE UP (ref 150–400)
PO2 BLDV: 46 MMHG — HIGH (ref 25–45)
POTASSIUM SERPL-MCNC: 4 MMOL/L — SIGNIFICANT CHANGE UP (ref 3.5–5.3)
POTASSIUM SERPL-SCNC: 4 MMOL/L — SIGNIFICANT CHANGE UP (ref 3.5–5.3)
PROT SERPL-MCNC: 6.2 G/DL — SIGNIFICANT CHANGE UP (ref 6–8.3)
PROT UR-MCNC: NEGATIVE — SIGNIFICANT CHANGE UP
RBC # BLD: 3.52 M/UL — LOW (ref 3.8–5.2)
RBC # FLD: 12.4 % — SIGNIFICANT CHANGE UP (ref 10.3–14.5)
SAO2 % BLDV: 77 % — SIGNIFICANT CHANGE UP (ref 67–88)
SODIUM SERPL-SCNC: 139 MMOL/L — SIGNIFICANT CHANGE UP (ref 135–145)
SP GR SPEC: 1.01 — SIGNIFICANT CHANGE UP (ref 1.01–1.02)
UROBILINOGEN FLD QL: 1
WBC # BLD: 3.02 K/UL — LOW (ref 3.8–10.5)
WBC # FLD AUTO: 3.02 K/UL — LOW (ref 3.8–10.5)

## 2021-01-28 PROCEDURE — 82803 BLOOD GASES ANY COMBINATION: CPT

## 2021-01-28 PROCEDURE — 81001 URINALYSIS AUTO W/SCOPE: CPT

## 2021-01-28 PROCEDURE — 85025 COMPLETE CBC W/AUTO DIFF WBC: CPT

## 2021-01-28 PROCEDURE — 99283 EMERGENCY DEPT VISIT LOW MDM: CPT

## 2021-01-28 PROCEDURE — 82962 GLUCOSE BLOOD TEST: CPT

## 2021-01-28 PROCEDURE — 82009 KETONE BODYS QUAL: CPT

## 2021-01-28 PROCEDURE — 36415 COLL VENOUS BLD VENIPUNCTURE: CPT

## 2021-01-28 PROCEDURE — 99284 EMERGENCY DEPT VISIT MOD MDM: CPT

## 2021-01-28 PROCEDURE — 87086 URINE CULTURE/COLONY COUNT: CPT

## 2021-01-28 PROCEDURE — 80053 COMPREHEN METABOLIC PANEL: CPT

## 2021-01-28 RX ORDER — SODIUM CHLORIDE 9 MG/ML
1000 INJECTION INTRAMUSCULAR; INTRAVENOUS; SUBCUTANEOUS ONCE
Refills: 0 | Status: COMPLETED | OUTPATIENT
Start: 2021-01-28 | End: 2021-01-28

## 2021-01-28 RX ADMIN — SODIUM CHLORIDE 1000 MILLILITER(S): 9 INJECTION INTRAMUSCULAR; INTRAVENOUS; SUBCUTANEOUS at 21:22

## 2021-01-28 NOTE — ED ADULT NURSE NOTE - NSIMPLEMENTINTERV_GEN_ALL_ED
Implemented All Universal Safety Interventions:  [place] to call system. Call bell, personal items and telephone within reach. Instruct patient to call for assistance. Room bathroom lighting operational. Non-slip footwear when patient is off stretcher. Physically safe environment: no spills, clutter or unnecessary equipment. Stretcher in lowest position, wheels locked, appropriate side rails in place.

## 2021-01-28 NOTE — ED PROVIDER NOTE - PATIENT PORTAL LINK FT
You can access the FollowMyHealth Patient Portal offered by [facility] by registering at the following website: [email or web address]. By joining [employer]’s FollowMyHealth portal, you will also be able to view your health information using other applications (apps) compatible with our system.

## 2021-01-28 NOTE — ED PROVIDER NOTE - OBJECTIVE STATEMENT
69 y/o F pt with a PMHx of DM, HTN, prior CVA, was advised to come to ED by her PCP for elevated blood glucose. Notes she does not recall the name of her PCP. States she is not compliant with insulin. Patient denies any symptoms, specifically abdominal pain, nausea, vomiting, fever, shortness of breath or chest pain.

## 2021-01-28 NOTE — ED PROVIDER NOTE - PROGRESS NOTE DETAILS
Elevated BG but improving and no DKA or acute complications.  She understands plan to continue insulin compliance and proper diet.  Advised strict return precautions and PMD f/u.

## 2021-01-28 NOTE — ED PROVIDER NOTE - NSFOLLOWUPINSTRUCTIONS_ED_ALL_ED_FT
Diabetic Hyperglycemia    WHAT YOU NEED TO KNOW:    Diabetic hyperglycemia is a blood glucose (sugar) level that is higher than your diabetes care team provider recommends. You may have increased thirst and urinate more often than usual.     DISCHARGE INSTRUCTIONS:    Call 911 for any of the following:   •You have a seizure.       •You begin to breathe fast or are short of breath.       •You become weak and confused.       Return to the emergency department if:   •Your blood sugar level is over 240 mg/dL and you have ketones in your urine.      •Your breath smells fruity.       •You have nausea and are vomiting.       •You have symptoms of dehydration, such as dark yellow urine, dry mouth and lips, and dry skin.       Call your care team provider if:   •You continue to have higher blood sugar levels than your care team provider recommends.      •You have questions or concerns about your condition or care.       Medicines:   •Medicines, such as insulin and diabetes pills, decrease blood sugar levels.       •Take your medicine as directed. Contact your healthcare provider if you think your medicine is not helping or if you have side effects. Tell him or her if you are allergic to any medicine. Keep a list of the medicines, vitamins, and herbs you take. Include the amounts, and when and why you take them. Bring the list or the pill bottles to follow-up visits. Carry your medicine list with you in case of an emergency.      Manage diabetic hyperglycemia:   •If you take diabetes medicine or insulin, take it as directed. Missed or wrong doses can cause your blood sugar to go up.      •Tell your care team provider if you continue to have trouble managing your blood sugar. He or she may change the type, amount, or timing of your diabetes medicine or insulin. If you do not take diabetes medicine or insulin, you may need to start.       •Work with your care team provider to develop a sick day plan. Illness can cause your blood sugar to rise. A sick day plan helps you control your blood sugar level when you are sick.       Prevent diabetic hyperglycemia:   •Check your blood sugar levels regularly. Ask your care team provider how often to check your blood sugar and what your levels should be.       •Follow your meal plan. Your blood sugar can go up if you eat a large meal or you eat more carbohydrates than recommended. Work with a dietitian to develop a meal plan that is right for you.       •Exercise as directed. Physical activity, such as exercise, can help lower your blood sugar when it is high. It can also keep your blood sugar levels steady over time. Be active for at least 30 minutes, 5 days a week. Include muscle strengthening activities 2 days each week. Do not sit for longer than 30 minutes at a time. Work with your care team provider to create an activity plan. Children should get at least 60 minutes of physical activity each day.       •Check your ketones before exercise if your blood sugar level is above 240 mg/dL. Do not exercise if you have ketones in your urine because your blood sugar level may rise even more. Ask your healthcare provider how to lower your blood sugar when you have ketones.       Follow up with your care team provider as directed: Your care team provider may refer you to a dietitian. He or she can help you manage your blood sugar levels. Write down your questions so you remember to ask them during your visits.        © Copyright [employer] 2021           back to top                          © Copyright [employer] 2021

## 2021-01-29 PROBLEM — I10 ESSENTIAL (PRIMARY) HYPERTENSION: Chronic | Status: ACTIVE | Noted: 2021-01-21

## 2021-01-29 PROBLEM — I63.9 CEREBRAL INFARCTION, UNSPECIFIED: Chronic | Status: ACTIVE | Noted: 2021-01-21

## 2021-01-30 LAB
CULTURE RESULTS: SIGNIFICANT CHANGE UP
SPECIMEN SOURCE: SIGNIFICANT CHANGE UP

## 2021-02-04 PROBLEM — E11.9 TYPE 2 DIABETES MELLITUS WITHOUT COMPLICATIONS: Chronic | Status: ACTIVE | Noted: 2021-01-28

## 2021-02-04 LAB
GLUCAGON SERPL-MCNC: <6 PG/ML — LOW (ref 8–57)
GLUCAGON, SERUM - COMMENT: SIGNIFICANT CHANGE UP

## 2021-02-05 ENCOUNTER — LABORATORY RESULT (OUTPATIENT)
Age: 71
End: 2021-02-05

## 2021-02-05 ENCOUNTER — APPOINTMENT (OUTPATIENT)
Dept: NEUROLOGY | Facility: CLINIC | Age: 71
End: 2021-02-05
Payer: MEDICARE

## 2021-02-05 VITALS
TEMPERATURE: 97.2 F | HEIGHT: 62 IN | HEART RATE: 85 BPM | BODY MASS INDEX: 21.53 KG/M2 | DIASTOLIC BLOOD PRESSURE: 77 MMHG | WEIGHT: 117 LBS | SYSTOLIC BLOOD PRESSURE: 142 MMHG | OXYGEN SATURATION: 98 %

## 2021-02-05 DIAGNOSIS — E11.319 TYPE 2 DIABETES MELLITUS WITH UNSPECIFIED DIABETIC RETINOPATHY W/OUT MACULAR EDEMA: ICD-10-CM

## 2021-02-05 PROCEDURE — 99072 ADDL SUPL MATRL&STAF TM PHE: CPT

## 2021-02-05 PROCEDURE — 99214 OFFICE O/P EST MOD 30 MIN: CPT

## 2021-02-11 NOTE — DISCUSSION/SUMMARY
[FreeTextEntry1] : Likely recent and remote stroke with multi-infarct state. cerebral atherosclerosis due to uncontrolled diabetes Mellitus.  Called and discussed with her daughter need for careful monitoring of medications food . Encouraged walking. Most likely suffered >1 stroke in the recent months. Await MRI and MRA of head ordered Jan 21 to confirm, cardiology and ophthalmology referral and return. Re-check labs to ensure her glucose is low unlike the last visit

## 2021-02-11 NOTE — HISTORY OF PRESENT ILLNESS
[FreeTextEntry1] : In 2010 she suffered  difficulty walking and a tendency to fall to her left. She was taken to the hospital and evaluated and sent home with a diagnosis of stroke. She had to retire at that point. Three months ago in the midst of the Coronavirus pandemic she suffered an acute attack of vertigo (spinning) that she suffered on her own for 3 days after which she was better. She did not go to the hospital for fear of the pandemic. At some time in the past she was evaluated by a cardiologist who monitored her rhythm with a recorder for 3 months and reportedly found not abnormality\par \par Feels better; was referred to [facility] because of extremely high blood glucose and treated overnight and released. Claims she is more compliant now with her medications

## 2021-02-11 NOTE — PHYSICAL EXAM
[General Appearance - Alert] : alert [General Appearance - In No Acute Distress] : in no acute distress [Oriented To Time, Place, And Person] : oriented to person, place, and time [Impaired Insight] : insight and judgment were intact [Affect] : the affect was normal [Person] : oriented to person [Place] : oriented to place [Time] : oriented to time [Short Term Intact] : short term memory intact [Remote Intact] : remote memory intact [Registration Intact] : recent registration memory intact [Span Intact] : the attention span was normal [Concentration Intact] : normal concentrating ability [Visual Intact] : visual attention was ~T not ~L decreased [Naming Objects] : no difficulty naming common objects [Repeating Phrases] : no difficulty repeating a phrase [Fluency] : fluency intact [Comprehension] : comprehension intact [Cranial Nerves Optic (II)] : visual acuity intact bilaterally,  visual fields full to confrontation, pupils equal round and reactive to light [Cranial Nerves Oculomotor (III)] : extraocular motion intact [Cranial Nerves Trigeminal (V)] : facial sensation intact symmetrically [Cranial Nerves Facial (VII)] : face symmetrical [Cranial Nerves Vestibulocochlear (VIII)] : hearing was intact bilaterally [Cranial Nerves Glossopharyngeal (IX)] : tongue and palate midline [Cranial Nerves Accessory (XI - Cranial And Spinal)] : head turning and shoulder shrug symmetric [CNS Hypoglossal Tongue Protrusion Deviated To Left] : tongue deviates to the left with protrusion [Motor Handedness Right-Handed] : the patient is right hand dominant [Paresis Pronator Drift Left-Sided] : a left-sided pronator drift was present [Motor Strength Upper Extremities Left] : there was weakness of the left upper extremity [Motor Strength Lower Extremities Left] : there was weakness of the left lower extremity [Sensation Tactile Decrease] : light touch was intact [Sensation Pain / Temperature Decrease] : pain and temperature was intact [Sensation Vibration Decrease] : vibration was intact [Proprioception] : proprioception was intact [Limited Balance] : the patient's balance was impaired [Dysdiadochokinesia On The Left] : present on the left side [Coordination - Dysmetria Impaired Finger-to-Nose Left Only] : present on the left side [Coordination Dysmetria Impaired Heel-to-Shin Using Left Heel] : present on the left side [2+] : Brachioradialis right 2+ [3+] : Brachioradialis left 3+ [1+] : Patella left 1+ [0] : Ankle jerk left 0 [Plantar Reflex Left Only] : abnormal on the left [Sclera] : the sclera and conjunctiva were normal [PERRL With Normal Accommodation] : pupils were equal in size, round, reactive to light, with normal accommodation [Optic Disc Abnormality] : the optic disc were normal in size and color [Extraocular Movements] : extraocular movements were intact [No APD] : no afferent pupillary defect [No MALIKA] : no internuclear ophthalmoplegia [Full Visual Field] : full visual field [Outer Ear] : the ears and nose were normal in appearance [Oropharynx] : the oropharynx was normal [Paresis Pronator Drift Right-Sided] : no pronator drift on the right [Dysdiadochokinesia On The Right] : not present on the ride side [Coordination - Dysmetria Impaired Finger-to-Nose Right Only] : not present on the ride side [Plantar Reflex Right Only] : normal on the right [___] : absent on the right [___] : absent on the left [Primitive Reflexes] : primitive reflexes were absent [FreeTextEntry1] : Acuity 20/30 OS 20/30-3 OD with correction

## 2021-03-02 ENCOUNTER — APPOINTMENT (OUTPATIENT)
Dept: NEUROLOGY | Facility: CLINIC | Age: 71
End: 2021-03-02

## 2021-03-09 ENCOUNTER — APPOINTMENT (OUTPATIENT)
Dept: NEUROLOGY | Facility: CLINIC | Age: 71
End: 2021-03-09

## 2021-03-24 NOTE — ED PROVIDER NOTE - NS ED MD DISPO DISCHARGE
Left femoral vein central venous catheter placement    Patient without adequate IV access. Under ultrasound guidance left femoral vein cannulated with dark nonpulsatile blood return. Wire advanced with minimal resistance. Scalpel used to make incision. Home

## 2021-04-29 LAB
ANION GAP SERPL CALC-SCNC: 11 MMOL/L
BASOPHILS # BLD AUTO: 0.04 K/UL
BASOPHILS NFR BLD AUTO: 0.9 %
BUN SERPL-MCNC: 7 MG/DL
CALCIUM SERPL-MCNC: 9.6 MG/DL
CHLORIDE SERPL-SCNC: 106 MMOL/L
CO2 SERPL-SCNC: 26 MMOL/L
CREAT SERPL-MCNC: 0.82 MG/DL
EOSINOPHIL # BLD AUTO: 0.17 K/UL
EOSINOPHIL NFR BLD AUTO: 4 %
GLUCOSE SERPL-MCNC: 108 MG/DL
HCT VFR BLD CALC: 35.7 %
HGB BLD-MCNC: 10.4 G/DL
IMM GRANULOCYTES NFR BLD AUTO: 0 %
LYMPHOCYTES # BLD AUTO: 1.26 K/UL
LYMPHOCYTES NFR BLD AUTO: 29.5 %
MAN DIFF?: NORMAL
MCHC RBC-ENTMCNC: 29.1 GM/DL
MCHC RBC-ENTMCNC: 29.9 PG
MCV RBC AUTO: 102.6 FL
MONOCYTES # BLD AUTO: 0.28 K/UL
MONOCYTES NFR BLD AUTO: 6.6 %
NEUTROPHILS # BLD AUTO: 2.52 K/UL
NEUTROPHILS NFR BLD AUTO: 59 %
PLATELET # BLD AUTO: 311 K/UL
POTASSIUM SERPL-SCNC: 4.6 MMOL/L
RBC # BLD: 3.48 M/UL
RBC # FLD: 13.4 %
SODIUM SERPL-SCNC: 143 MMOL/L
WBC # FLD AUTO: 4.27 K/UL

## 2021-07-23 ENCOUNTER — APPOINTMENT (OUTPATIENT)
Dept: NEUROLOGY | Facility: CLINIC | Age: 71
End: 2021-07-23

## 2022-12-05 NOTE — DIETITIAN INITIAL EVALUATION ADULT. - SIGNS/SYMPTOMS
Patient Reported Weight(Optional But Include Units): 155 Length Of Therapy: 3+ years Add High Risk Medication Management Associated Diagnosis?: Yes Detail Level: Detailed Lack of prior exposure to accurate information on diabetes/meal planning

## 2024-12-18 NOTE — ED ADULT NURSE NOTE - CAS TRG GEN SKIN COLOR
Problem: Depressive Symptoms  Goal: #Depressive s/s (self-reported/observed) are recognized and monitored  Outcome: Monitoring/Evaluating progress  Goal: #Verbalizes understanding of depressive symptoms management  Description: Document in Patient Education Activity  Outcome: Monitoring/Evaluating progress     Problem: Suicide, Risk for  Goal: Remains free from self-harm  Outcome: Monitoring/Evaluating progress  Goal: #Suicidal thoughts, plans, and behaviors are monitored  Description: Suicidal Thoughts  a) Passive thoughts about wanting to be dead with no plan, past history of suicide, or suicidal behavior  b) Active suicidal ideation involves an existing wish to die accompanied by a plan for how to carry out the death, or suicidal behavior.  Active suicidal ideation/suicidal behavior require intervention  Suicide Behaviors (including preparatory acts)  Acts or preparation toward making a suicide attempt, but before potential for harm has begun.  This includes behaviors such as assembling a method (e.g. buying a gun, collecting pills) or preparing for one's death by suicide (e.g. writing a suicide note, giving things away)  Outcome: Monitoring/Evaluating progress  Goal: #Verbalizes understanding of suicide monitoring, precautions, and prevention  Description: Document in Patient Education Activity   Outcome: Monitoring/Evaluating progress     Problem: Pain  Goal: Acceptable pain level achieved/maintained at rest using appropriate pain scale for the patient  Outcome: Monitoring/Evaluating progress  Goal: Acceptable pain level achieved/maintained with activity using appropriate pain scale for the patient  Outcome: Monitoring/Evaluating progress  Goal: Acceptable pain level achieved/maintained without oversedation  Outcome: Monitoring/Evaluating progress      Normal for race

## 2025-02-13 NOTE — H&P ADULT - BACK
"          Clinical Nutrition Assessment     Patient Name: Ashley Younger  YOB: 1941  MRN: [id number]  Date of Encounter: 02/13/25 18:48 EST  Admission date: 2/11/2025  Reason for Visit: Identified at risk by screening criteria    Assessment   Nutrition Assessment   Admission Diagnosis:  Influenza [J11.1]    Problem List:    Influenza    Urinary tract infection, site not specified    Hypoxia      PMH:   She  has a past medical history of BENJIE (acute kidney injury), Arthritis, Cholelithiasis, Chronic kidney disease, stage 3 unspecified, DJD (degenerative joint disease), Encounter for removal of sutures, GERD (gastroesophageal reflux disease), Headache, History of  brain aneurysm, Hyperlipidemia, Hypertension, Orthostatic hypotension, Osteoarthritis, Presence of neurostimulator, Recurrent falls, Renal insufficiency, Scalp laceration, and Urinary tract infection.    PSH:  She  has a past surgical history that includes Cholecystectomy; ORIF wrist fracture (Left); Cranial Neurostimulator Insertion/Replacement; and ERCP.    Applicable Nutrition History:   2/12 FEES SLP rec Mechanical soft food No Mixed Consistencies, Honey thick liquid.  2/13 SLP rec NPO vs [facility] Soft comfort diet.    Anthropometrics     Height: Height: 167.6 cm (66\")  Last Filed Weight: Weight: 54.4 kg (120 lb) (02/11/25 1103)  Method:  no method  BMI: BMI (Calculated): 19.4    UBW:  Per EMR w of 122 lbs on 7/3/24 and 125 lbs on 9/4/24  Weight change: inadequate data for evaluation at this time.    Nutrition Focused Physical Exam    Date: 2/13    Unable to perform due to Pt unable to participate at time of visit     Subjective   Reported/Observed/Food/Nutrition Related History:     2/13  Pt slumbering at time of visit. Per RN pt not eating much. Might try to feed Magic Cup to her.    Needs Assessment   Date:2/13    Height used:Height: 167.6 cm (66\")  Weights used: 54.4 Kg    Estimated Calorie needs: ~ 1400 Kcal/day  Method:  [id number] x 1.3 = " 1334  Method:  Kcals/KGx 25 = 1362    Estimated Protein needs: ~65 g PRO/day  Method: g/Kg 1.2    Estimated Fluid needs: ~ ml/day   Per clinical status    Current Nutrition Prescription   PO: Diet: Regular/House; No Mixed Consistencies, No Straw, Feeding Assistance - Nursing; Texture: Mechanical Ground (NDD 2); Fluid Consistency: Honey Thick  Oral Nutrition Supplement:   Intake: 1 Day:8% x 3 meals recorded    Assessment & Plan   Nutrition Diagnosis   Date:  2/13            Updated:    Problem Inadequate oral intake    Etiology AMS Dysphagia w modified diet   Signs/Symptoms 8% x 3 meals recorded   Status: New      Goal / Objectives:   Nutrition to support treatment and Increase intake as feasible/appropriate      Nutrition Intervention      Follow treatment progress, Care plan reviewed, Menu provided, Supplement provided, EN rec prepared    Menu provided to room for RN use if cont on comfort diet  Magic Cup added 2x/da for use on comfort diet    IF determine EN appropriate consider:  Fibersource HN begin 25 ml/hr advance w tolerance by 15 ml/hr ev 12 hr to goal 55 ml/hr Water at 20 ml/hr. Over 22 hr to supply 1210 ml for 1452 Kcal 104% est need, 65 g % est need, 18.4 g Fiber 980 ml Water in EN 1420 total ml Free Water.     Monitoring/Evaluation:   Per protocol, I&O, PO intake, Supplement intake, Pertinent labs, Weight, Symptoms, POC/GOC, Swallow function    Belen Ferrell RD  Time Spent: 30 min   detailed exam